# Patient Record
Sex: MALE | Race: WHITE | NOT HISPANIC OR LATINO | Employment: UNEMPLOYED | URBAN - METROPOLITAN AREA
[De-identification: names, ages, dates, MRNs, and addresses within clinical notes are randomized per-mention and may not be internally consistent; named-entity substitution may affect disease eponyms.]

---

## 2017-01-24 DIAGNOSIS — B20 HUMAN IMMUNODEFICIENCY VIRUS (HIV) DISEASE (H): Primary | ICD-10-CM

## 2017-03-09 DIAGNOSIS — B20 HUMAN IMMUNODEFICIENCY VIRUS (HIV) DISEASE (H): ICD-10-CM

## 2017-03-09 DIAGNOSIS — R19.7 DIARRHEA: ICD-10-CM

## 2017-03-09 RX ORDER — EMTRICITABINE AND TENOFOVIR DISOPROXIL FUMARATE 200; 300 MG/1; MG/1
1 TABLET, FILM COATED ORAL DAILY
Qty: 30 TABLET | Refills: 0 | Status: SHIPPED | OUTPATIENT
Start: 2017-03-09 | End: 2017-04-13

## 2017-03-09 RX ORDER — LOPERAMIDE HYDROCHLORIDE 2 MG/1
TABLET ORAL
Qty: 60 TABLET | Refills: 0 | Status: SHIPPED | OUTPATIENT
Start: 2017-03-09 | End: 2017-08-11

## 2017-04-13 DIAGNOSIS — B20 HUMAN IMMUNODEFICIENCY VIRUS (HIV) DISEASE (H): ICD-10-CM

## 2017-04-13 RX ORDER — EMTRICITABINE AND TENOFOVIR DISOPROXIL FUMARATE 200; 300 MG/1; MG/1
1 TABLET, FILM COATED ORAL DAILY
Qty: 30 TABLET | Refills: 0 | Status: SHIPPED | OUTPATIENT
Start: 2017-04-13 | End: 2017-06-01

## 2017-06-01 DIAGNOSIS — B20 HUMAN IMMUNODEFICIENCY VIRUS (HIV) DISEASE (H): ICD-10-CM

## 2017-06-01 RX ORDER — EMTRICITABINE AND TENOFOVIR DISOPROXIL FUMARATE 200; 300 MG/1; MG/1
1 TABLET, FILM COATED ORAL DAILY
Qty: 30 TABLET | Refills: 1 | Status: SHIPPED | OUTPATIENT
Start: 2017-06-01 | End: 2017-07-14

## 2017-07-14 DIAGNOSIS — B20 HUMAN IMMUNODEFICIENCY VIRUS (HIV) DISEASE (H): ICD-10-CM

## 2017-07-14 LAB
ALBUMIN SERPL-MCNC: 3.9 G/DL (ref 3.4–5)
ALP SERPL-CCNC: 86 U/L (ref 40–150)
ALT SERPL W P-5'-P-CCNC: 17 U/L (ref 0–70)
ANION GAP SERPL CALCULATED.3IONS-SCNC: 7 MMOL/L (ref 3–14)
AST SERPL W P-5'-P-CCNC: 8 U/L (ref 0–45)
BASOPHILS # BLD AUTO: 0 10E9/L (ref 0–0.2)
BASOPHILS NFR BLD AUTO: 0.2 %
BILIRUB SERPL-MCNC: 0.3 MG/DL (ref 0.2–1.3)
BUN SERPL-MCNC: 15 MG/DL (ref 7–30)
CALCIUM SERPL-MCNC: 8.6 MG/DL (ref 8.5–10.1)
CD3 CELLS # BLD: 1429 CELLS/UL (ref 603–2990)
CD3 CELLS NFR BLD: 84 % (ref 49–84)
CD3+CD4+ CELLS # BLD: 718 CELLS/UL (ref 441–2156)
CD3+CD4+ CELLS NFR BLD: 42 % (ref 28–63)
CD3+CD4+ CELLS/CD3+CD8+ CLL BLD: 1 % (ref 1.4–2.6)
CD3+CD8+ CELLS # BLD: 724 CELLS/UL (ref 125–1312)
CD3+CD8+ CELLS NFR BLD: 42 % (ref 10–40)
CHLORIDE SERPL-SCNC: 106 MMOL/L (ref 94–109)
CO2 SERPL-SCNC: 25 MMOL/L (ref 20–32)
CREAT SERPL-MCNC: 1.05 MG/DL (ref 0.66–1.25)
DIFFERENTIAL METHOD BLD: ABNORMAL
EOSINOPHIL # BLD AUTO: 0.1 10E9/L (ref 0–0.7)
EOSINOPHIL NFR BLD AUTO: 0.8 %
ERYTHROCYTE [DISTWIDTH] IN BLOOD BY AUTOMATED COUNT: 13.2 % (ref 10–15)
GFR SERPL CREATININE-BSD FRML MDRD: 79 ML/MIN/1.7M2
GLUCOSE SERPL-MCNC: 82 MG/DL (ref 70–99)
HCT VFR BLD AUTO: 41.5 % (ref 40–53)
HGB BLD-MCNC: 14.3 G/DL (ref 13.3–17.7)
IFC SPECIMEN: ABNORMAL
IMM GRANULOCYTES # BLD: 0 10E9/L (ref 0–0.4)
IMM GRANULOCYTES NFR BLD: 0.3 %
IMMUNODEFICIENCY MARKERS SPEC-IMP: ABNORMAL
LYMPHOCYTES # BLD AUTO: 1.6 10E9/L (ref 0.8–5.3)
LYMPHOCYTES NFR BLD AUTO: 27.1 %
MCH RBC QN AUTO: 33.8 PG (ref 26.5–33)
MCHC RBC AUTO-ENTMCNC: 34.5 G/DL (ref 31.5–36.5)
MCV RBC AUTO: 98 FL (ref 78–100)
MONOCYTES # BLD AUTO: 0.4 10E9/L (ref 0–1.3)
MONOCYTES NFR BLD AUTO: 7.3 %
NEUTROPHILS # BLD AUTO: 3.9 10E9/L (ref 1.6–8.3)
NEUTROPHILS NFR BLD AUTO: 64.3 %
NRBC # BLD AUTO: 0 10*3/UL
NRBC BLD AUTO-RTO: 0 /100
PLATELET # BLD AUTO: 198 10E9/L (ref 150–450)
POTASSIUM SERPL-SCNC: 4.4 MMOL/L (ref 3.4–5.3)
PROT SERPL-MCNC: 7.3 G/DL (ref 6.8–8.8)
RBC # BLD AUTO: 4.23 10E12/L (ref 4.4–5.9)
SODIUM SERPL-SCNC: 138 MMOL/L (ref 133–144)
WBC # BLD AUTO: 6.1 10E9/L (ref 4–11)

## 2017-07-14 PROCEDURE — 86359 T CELLS TOTAL COUNT: CPT | Performed by: INTERNAL MEDICINE

## 2017-07-14 PROCEDURE — 86360 T CELL ABSOLUTE COUNT/RATIO: CPT | Performed by: INTERNAL MEDICINE

## 2017-07-14 PROCEDURE — 87536 HIV-1 QUANT&REVRSE TRNSCRPJ: CPT | Performed by: INTERNAL MEDICINE

## 2017-07-14 RX ORDER — EMTRICITABINE AND TENOFOVIR DISOPROXIL FUMARATE 200; 300 MG/1; MG/1
1 TABLET, FILM COATED ORAL DAILY
Qty: 30 TABLET | Refills: 0 | Status: SHIPPED | OUTPATIENT
Start: 2017-07-14 | End: 2017-08-20

## 2017-07-18 LAB
HIV1 RNA # PLAS NAA DL=20: ABNORMAL {COPIES}/ML
HIV1 RNA SERPL NAA+PROBE-LOG#: <1.3 {LOG_COPIES}/ML

## 2017-07-26 ENCOUNTER — OFFICE VISIT (OUTPATIENT)
Dept: INFECTIOUS DISEASES | Facility: CLINIC | Age: 37
End: 2017-07-26
Attending: INTERNAL MEDICINE
Payer: MEDICARE

## 2017-07-26 VITALS
TEMPERATURE: 98.6 F | RESPIRATION RATE: 18 BRPM | WEIGHT: 171.08 LBS | OXYGEN SATURATION: 95 % | HEIGHT: 72 IN | SYSTOLIC BLOOD PRESSURE: 124 MMHG | BODY MASS INDEX: 23.17 KG/M2 | HEART RATE: 98 BPM | DIASTOLIC BLOOD PRESSURE: 78 MMHG

## 2017-07-26 DIAGNOSIS — Z63.79 STRESSFUL LIFE EVENTS AFFECTING FAMILY AND HOUSEHOLD: ICD-10-CM

## 2017-07-26 DIAGNOSIS — F31.9 BIPOLAR AFFECTIVE DISORDER, REMISSION STATUS UNSPECIFIED (H): ICD-10-CM

## 2017-07-26 DIAGNOSIS — B20 HUMAN IMMUNODEFICIENCY VIRUS (HIV) DISEASE (H): Primary | ICD-10-CM

## 2017-07-26 DIAGNOSIS — F15.10 METHAMPHETAMINE USE (H): ICD-10-CM

## 2017-07-26 DIAGNOSIS — J06.9 UPPER RESPIRATORY INFECTION, VIRAL: ICD-10-CM

## 2017-07-26 PROCEDURE — 99213 OFFICE O/P EST LOW 20 MIN: CPT | Mod: ZF

## 2017-07-26 RX ORDER — DIVALPROEX SODIUM 500 MG/1
500 TABLET, DELAYED RELEASE ORAL 2 TIMES DAILY
COMMUNITY
End: 2017-07-26 | Stop reason: DRUGHIGH

## 2017-07-26 RX ORDER — LORAZEPAM 0.5 MG/1
0.5 TABLET ORAL EVERY 6 HOURS PRN
COMMUNITY
End: 2017-09-27

## 2017-07-26 RX ORDER — DIVALPROEX SODIUM 250 MG/1
250 TABLET, DELAYED RELEASE ORAL 2 TIMES DAILY
COMMUNITY
End: 2018-02-05

## 2017-07-26 ASSESSMENT — PAIN SCALES - GENERAL: PAINLEVEL: NO PAIN (0)

## 2017-07-26 NOTE — NURSING NOTE
"Chief Complaint   Patient presents with     RECHECK     B20       Initial /78 (BP Location: Right arm, Patient Position: Sitting, Cuff Size: Adult Regular)  Pulse 98  Temp 98.6  F (37  C) (Oral)  Resp 18  Ht 1.829 m (6' 0.01\")  Wt 77.6 kg (171 lb 1.2 oz)  SpO2 95%  BMI 23.2 kg/m2 Estimated body mass index is 23.2 kg/(m^2) as calculated from the following:    Height as of this encounter: 1.829 m (6' 0.01\").    Weight as of this encounter: 77.6 kg (171 lb 1.2 oz).  Medication Reconciliation: complete     Chelsey Corona CMA  7/26/2017 2:36 PM        "

## 2017-07-26 NOTE — MR AVS SNAPSHOT
After Visit Summary   7/26/2017    Reynaldo Branch    MRN: [id number]           Patient Information     Date Of Birth          1980        Visit Information        Provider Department      7/26/2017 2:30 PM Yury Ramirez MD [facility] and Infectious Diseases        Today's Diagnoses     Human immunodeficiency virus (HIV) disease (H)    -  1    Methamphetamine use        Upper respiratory infection, viral        Bipolar affective disorder, remission status unspecified (H)        Stressful life events affecting family and household           Follow-ups after your visit        Follow-up notes from your care team     Return in about 3 months (around 10/26/2017).      Your next 10 appointments already scheduled     Oct 25, 2017  4:30 PM CDT   (Arrive by 4:15 PM)   Return Visit with MD JAGDEEP Doe [facility] and Infectious Diseases ([facility])    [address] [place] [id number] [phone]              Who to contact     If you have questions or need follow up information about today's clinic visit or your schedule please contact [facility] AND INFECTIOUS DISEASES directly at [phone].  Normal or non-critical lab and imaging results will be communicated to you by MyChart, letter or phone within 4 business days after the clinic has received the results. If you do not hear from us within 7 days, please contact the clinic through MyChart or phone. If you have a critical or abnormal lab result, we will notify you by phone as soon as possible.  Submit refill requests through [employer] or call your pharmacy and they will forward the refill request to us. Please allow 3 business days for your refill to be completed.          Additional Information About Your Visit        Care EveryWhere ID     This is your Care EveryWhere ID. This could be used by other organizations to access your [facility]  "records  [id number]        Your Vitals Were     Pulse Temperature Respirations Height Pulse Oximetry BMI (Body Mass Index)    98 98.6  F (37  C) (Oral) 18 1.829 m (6' 0.01\") 95% 23.2 kg/m2       Blood Pressure from Last 3 Encounters:   07/26/17 124/78   06/10/15 121/67   11/05/14 141/85    Weight from Last 3 Encounters:   07/26/17 77.6 kg (171 lb 1.2 oz)   06/10/15 85.2 kg (187 lb 14.4 oz)   11/05/14 81.8 kg (180 lb 6.4 oz)                 Today's Medication Changes          These changes are accurate as of: 7/26/17 11:59 PM.  If you have any questions, ask your nurse or doctor.               These medicines have changed or have updated prescriptions.        Dose/Directions    divalproex 250 MG EC tablet   Commonly known as:  DEPAKOTE   This may have changed:  Another medication with the same name was removed. Continue taking this medication, and follow the directions you see here.   Changed by:  Yury Ramirez MD        Dose:  250 mg   Take 250 mg by mouth 2 times daily   Refills:  0            Where to get your medicines      These medications were sent to [facility] [address] [place] [id number]    Hours:  24-hours Phone:  [phone]     darunavir 800 MG tablet    emtricitabine-tenofovir 200-300 MG per tablet    ritonavir 100 MG capsule                Primary Care Provider Office Phone # Fax #    Yury Ramirez [phone]       [address] [place] [address]  [facility] [address]        Equal Access to Services     JANE MILLAN AH: Hadii julio c Palencia, warochelleda luqadaha, qaybta kaalmada vignesh gonzalez. So [facility] [phone].    ATENCIÓN: Si habla español, tiene a ariza disposición servicios gratuitos de asistencia lingüística. Llame al [phone].    We comply with applicable federal civil rights laws and [place] laws. We do not discriminate on the " basis of race, color, national origin, age, disability sex, sexual orientation or gender identity.            Thank you!     Thank you for choosing [facility] AND INFECTIOUS DISEASES  for your care. Our goal is always to provide you with excellent care. Hearing back from our patients is one way we can continue to improve our services. Please take a few minutes to complete the written survey that you may receive in the mail after your visit with us. Thank you!             Your Updated Medication List - Protect others around you: Learn how to safely use, store and throw away your medicines at www.disposemymeds.org.          This list is accurate as of: 7/26/17 11:59 PM.  Always use your most recent med list.                   Brand Name Dispense Instructions for use Diagnosis    darunavir 800 MG tablet    PREZISTA    30 tablet    Take 1 tablet (800 mg) by mouth daily    Human immunodeficiency virus (HIV) disease (H)       divalproex 250 MG EC tablet    DEPAKOTE     Take 250 mg by mouth 2 times daily        emtricitabine-tenofovir 200-300 MG per tablet    TRUVADA    30 tablet    Take 1 tablet by mouth daily    Human immunodeficiency virus (HIV) disease (H)       LORazepam 0.5 MG tablet    ATIVAN     Take 0.5 mg by mouth every 6 hours as needed for anxiety        ritonavir 100 MG capsule    NORVIR    30 capsule    Take 1 capsule (100 mg) by mouth daily    Human immunodeficiency virus (HIV) disease (H)

## 2017-08-11 DIAGNOSIS — R19.7 DIARRHEA: ICD-10-CM

## 2017-08-11 RX ORDER — LOPERAMIDE HYDROCHLORIDE 2 MG/1
TABLET ORAL
Qty: 60 TABLET | Refills: 5 | Status: SHIPPED | OUTPATIENT
Start: 2017-08-11 | End: 2018-02-05

## 2017-08-20 RX ORDER — EMTRICITABINE AND TENOFOVIR DISOPROXIL FUMARATE 200; 300 MG/1; MG/1
1 TABLET, FILM COATED ORAL DAILY
Qty: 30 TABLET | Refills: 5 | Status: SHIPPED | OUTPATIENT
Start: 2017-08-20 | End: 2017-09-27 | Stop reason: ALTCHOICE
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
## 2017-09-18 ENCOUNTER — OFFICE VISIT (OUTPATIENT)
Dept: INFECTIOUS DISEASES | Facility: CLINIC | Age: 37
End: 2017-09-18
Payer: MEDICARE

## 2017-09-18 DIAGNOSIS — Z71.89 COUNSELING AND COORDINATION OF CARE: Primary | ICD-10-CM

## 2017-09-18 DIAGNOSIS — B20 HUMAN IMMUNODEFICIENCY VIRUS (HIV) DISEASE (H): ICD-10-CM

## 2017-09-18 LAB
ALBUMIN SERPL-MCNC: 3.9 G/DL (ref 3.4–5)
ALP SERPL-CCNC: 87 U/L (ref 40–150)
ALT SERPL W P-5'-P-CCNC: 14 U/L (ref 0–70)
ANION GAP SERPL CALCULATED.3IONS-SCNC: 4 MMOL/L (ref 3–14)
AST SERPL W P-5'-P-CCNC: 8 U/L (ref 0–45)
BASOPHILS # BLD AUTO: 0 10E9/L (ref 0–0.2)
BASOPHILS NFR BLD AUTO: 0.3 %
BILIRUB SERPL-MCNC: 0.3 MG/DL (ref 0.2–1.3)
BUN SERPL-MCNC: 15 MG/DL (ref 7–30)
CALCIUM SERPL-MCNC: 8.3 MG/DL (ref 8.5–10.1)
CHLORIDE SERPL-SCNC: 106 MMOL/L (ref 94–109)
CO2 SERPL-SCNC: 30 MMOL/L (ref 20–32)
CREAT SERPL-MCNC: 1.26 MG/DL (ref 0.66–1.25)
DIFFERENTIAL METHOD BLD: ABNORMAL
EOSINOPHIL # BLD AUTO: 0 10E9/L (ref 0–0.7)
EOSINOPHIL NFR BLD AUTO: 0.6 %
ERYTHROCYTE [DISTWIDTH] IN BLOOD BY AUTOMATED COUNT: 12.8 % (ref 10–15)
GFR SERPL CREATININE-BSD FRML MDRD: 64 ML/MIN/1.7M2
GLUCOSE SERPL-MCNC: 83 MG/DL (ref 70–99)
HCT VFR BLD AUTO: 43.1 % (ref 40–53)
HGB BLD-MCNC: 14.7 G/DL (ref 13.3–17.7)
IMM GRANULOCYTES # BLD: 0 10E9/L (ref 0–0.4)
IMM GRANULOCYTES NFR BLD: 0.3 %
LIPASE SERPL-CCNC: 140 U/L (ref 73–393)
LYMPHOCYTES # BLD AUTO: 1.7 10E9/L (ref 0.8–5.3)
LYMPHOCYTES NFR BLD AUTO: 26 %
MCH RBC QN AUTO: 34 PG (ref 26.5–33)
MCHC RBC AUTO-ENTMCNC: 34.1 G/DL (ref 31.5–36.5)
MCV RBC AUTO: 100 FL (ref 78–100)
MONOCYTES # BLD AUTO: 0.4 10E9/L (ref 0–1.3)
MONOCYTES NFR BLD AUTO: 6.1 %
NEUTROPHILS # BLD AUTO: 4.2 10E9/L (ref 1.6–8.3)
NEUTROPHILS NFR BLD AUTO: 66.7 %
NRBC # BLD AUTO: 0 10*3/UL
NRBC BLD AUTO-RTO: 0 /100
PLATELET # BLD AUTO: 171 10E9/L (ref 150–450)
POTASSIUM SERPL-SCNC: 3.9 MMOL/L (ref 3.4–5.3)
PROT SERPL-MCNC: 7.2 G/DL (ref 6.8–8.8)
RBC # BLD AUTO: 4.32 10E12/L (ref 4.4–5.9)
SODIUM SERPL-SCNC: 140 MMOL/L (ref 133–144)
WBC # BLD AUTO: 6.4 10E9/L (ref 4–11)

## 2017-09-18 PROCEDURE — 86360 T CELL ABSOLUTE COUNT/RATIO: CPT | Performed by: INTERNAL MEDICINE

## 2017-09-18 PROCEDURE — 86359 T CELLS TOTAL COUNT: CPT | Performed by: INTERNAL MEDICINE

## 2017-09-18 PROCEDURE — 80053 COMPREHEN METABOLIC PANEL: CPT | Performed by: INTERNAL MEDICINE

## 2017-09-18 PROCEDURE — 85025 COMPLETE CBC W/AUTO DIFF WBC: CPT | Performed by: INTERNAL MEDICINE

## 2017-09-18 PROCEDURE — 83690 ASSAY OF LIPASE: CPT | Performed by: INTERNAL MEDICINE

## 2017-09-18 PROCEDURE — 36415 COLL VENOUS BLD VENIPUNCTURE: CPT | Performed by: INTERNAL MEDICINE

## 2017-09-18 PROCEDURE — 87536 HIV-1 QUANT&REVRSE TRNSCRPJ: CPT | Performed by: INTERNAL MEDICINE

## 2017-09-18 NOTE — MR AVS SNAPSHOT
After Visit Summary   9/18/2017    Reynaldo Branch    MRN: [id number]           Patient Information     Date Of Birth          1980        Visit Information        Provider Department      9/18/2017 3:00 PM Terrance Issa MSW [facility]        Today's Diagnoses     Counseling and coordination of care    -  1       Follow-ups after your visit        Your next 10 appointments already scheduled     Sep 27, 2017  4:30 PM CDT   (Arrive by 4:15 PM)   Return Visit with Yury Ramirez MD   [facility] ([facility])    [address] [place] [address]  3rd [facility] [address] [phone]              Who to contact     If you have questions or need follow up information about today's clinic visit or your schedule please contact [facility] AND INFECTIOUS DISEASES directly at [phone].  Normal or non-critical lab and imaging results will be communicated to you by MyChart, letter or phone within 4 business days after the clinic has received the results. If you do not hear from us within 7 days, please contact the clinic through MyChart or phone. If you have a critical or abnormal lab result, we will notify you by phone as soon as possible.  Submit refill requests through [employer] or call your pharmacy and they will forward the refill request to us. Please allow 3 business days for your refill to be completed.          Additional Information About Your Visit        Care EveryWhere ID     This is your Care EveryWhere ID. This could be used by other organizations to access your [place] medical records  [id number]         Blood Pressure from Last 3 Encounters:   07/26/17 124/78   06/10/15 121/67   11/05/14 141/85    Weight from Last 3 Encounters:   07/26/17 77.6 kg (171 lb 1.2 oz)   06/10/15 85.2 kg (187 lb 14.4 oz)   11/05/14 81.8 kg (180 lb 6.4 oz)              Today, you had the  following     No orders found for display       Primary Care Provider Office Phone # Fax #    Yury Ramirez [phone]       [address] [place] [address]        Equal Access to Services     JANE MILLAN : Hadii aad ku hadrichardo Socareyali, waaxda luqadaha, qaybta kaalmada adeegyada, vignesh tongcarmen torresluis magana raji juarez. So [facility] [phone].    ATENCIÓN: Si habla español, tiene a ariza disposición servicios gratuitos de asistencia lingüística. Llame al [phone].    We comply with applicable federal civil rights laws and [place] laws. We do not discriminate on the basis of race, color, national origin, age, disability sex, sexual orientation or gender identity.            Thank you!     Thank you for choosing [facility] AND INFECTIOUS DISEASES  for your care. Our goal is always to provide you with excellent care. Hearing back from our patients is one way we can continue to improve our services. Please take a few minutes to complete the written survey that you may receive in the mail after your visit with us. Thank you!             Your Updated Medication List - Protect others around you: Learn how to safely use, store and throw away your medicines at www.disposemymeds.org.          This list is accurate as of: 9/18/17 11:59 PM.  Always use your most recent med list.                   Brand Name Dispense Instructions for use Diagnosis    darunavir 800 MG tablet    PREZISTA    30 tablet    Take 1 tablet (800 mg) by mouth daily    Human immunodeficiency virus (HIV) disease (H)       divalproex 250 MG EC tablet    DEPAKOTE     Take 250 mg by mouth 2 times daily        emtricitabine-tenofovir 200-300 MG per tablet    TRUVADA    30 tablet    Take 1 tablet by mouth daily    Human immunodeficiency virus (HIV) disease (H)       loperamide 2 MG tablet    IMODIUM A-D    60 tablet    May use one or two tablets daily or twice daily as needed for diarrhea.    Diarrhea        LORazepam 0.5 MG tablet    ATIVAN     Take 0.5 mg by mouth every 6 hours as needed for anxiety        ritonavir 100 MG capsule    NORVIR    30 capsule    Take 1 capsule (100 mg) by mouth daily    Human immunodeficiency virus (HIV) disease (H)

## 2017-09-19 LAB
CD3 CELLS # BLD: 1391 CELLS/UL (ref 603–2990)
CD3 CELLS NFR BLD: 85 % (ref 49–84)
CD3+CD4+ CELLS # BLD: 715 CELLS/UL (ref 441–2156)
CD3+CD4+ CELLS NFR BLD: 44 % (ref 28–63)
CD3+CD4+ CELLS/CD3+CD8+ CLL BLD: 1.02 % (ref 1.4–2.6)
CD3+CD8+ CELLS # BLD: 692 CELLS/UL (ref 125–1312)
CD3+CD8+ CELLS NFR BLD: 43 % (ref 10–40)
HIV1 RNA # PLAS NAA DL=20: <20 {COPIES}/ML
HIV1 RNA SERPL NAA+PROBE-LOG#: <1.3 {LOG_COPIES}/ML
IFC SPECIMEN: ABNORMAL

## 2017-09-27 ENCOUNTER — OFFICE VISIT (OUTPATIENT)
Dept: INFECTIOUS DISEASES | Facility: CLINIC | Age: 37
End: 2017-09-27
Attending: INTERNAL MEDICINE
Payer: MEDICARE

## 2017-09-27 VITALS
BODY MASS INDEX: 24.57 KG/M2 | TEMPERATURE: 97.6 F | HEIGHT: 72 IN | DIASTOLIC BLOOD PRESSURE: 71 MMHG | SYSTOLIC BLOOD PRESSURE: 125 MMHG | WEIGHT: 181.4 LBS | HEART RATE: 79 BPM

## 2017-09-27 DIAGNOSIS — Z21 HUMAN IMMUNODEFICIENCY VIRUS I INFECTION (H): Primary | ICD-10-CM

## 2017-09-27 DIAGNOSIS — B20 HUMAN IMMUNODEFICIENCY VIRUS (HIV) DISEASE (H): ICD-10-CM

## 2017-09-27 DIAGNOSIS — Z72.0 TOBACCO ABUSE: ICD-10-CM

## 2017-09-27 DIAGNOSIS — R79.89 ELEVATED SERUM CREATININE: ICD-10-CM

## 2017-09-27 PROCEDURE — 99212 OFFICE O/P EST SF 10 MIN: CPT | Mod: ZF

## 2017-09-27 RX ORDER — NICOTINE 21 MG/24HR
1 PATCH, TRANSDERMAL 24 HOURS TRANSDERMAL EVERY 24 HOURS
Qty: 30 PATCH | Refills: 3 | Status: SHIPPED | OUTPATIENT
Start: 2017-09-27 | End: 2018-02-05

## 2017-09-27 ASSESSMENT — PAIN SCALES - GENERAL: PAINLEVEL: NO PAIN (0)

## 2017-09-27 NOTE — MR AVS SNAPSHOT
After Visit Summary   9/27/2017    Reynaldo Branch    MRN: [id number]           Patient Information     Date Of Birth          1980        Visit Information        Provider Department      9/27/2017 4:30 PM Yury Ramirez MD [facility] Infectious Diseases        Today's Diagnoses     Human immunodeficiency virus I infection (H)    -  1    Tobacco abuse        Elevated serum creatinine        Human immunodeficiency virus (HIV) disease (H)           Follow-ups after your visit        Follow-up notes from your care team     Return in about 6 months (around 3/27/2018).      Who to contact     If you have questions or need follow up information about today's clinic visit or your schedule please contact [facility] AND INFECTIOUS DISEASES directly at [phone].  Normal or non-critical lab and imaging results will be communicated to you by MyChart, letter or phone within 4 business days after the clinic has received the results. If you do not hear from us within 7 days, please contact the clinic through MyChart or phone. If you have a critical or abnormal lab result, we will notify you by phone as soon as possible.  Submit refill requests through [employer] or call your pharmacy and they will forward the refill request to us. Please allow 3 business days for your refill to be completed.          Additional Information About Your Visit        Care EveryWhere ID     This is your Care EveryWhere ID. This could be used by other organizations to access your [place] medical records  [id number]        Your Vitals Were     Pulse Temperature Height BMI (Body Mass Index)          79 97.6  F (36.4  C) (Oral) 1.829 m (6') 24.6 kg/m2         Blood Pressure from Last 3 Encounters:   09/27/17 125/71   07/26/17 124/78   06/10/15 121/67    Weight from Last 3 Encounters:   09/27/17 82.3 kg (181 lb 6.4 oz)   07/26/17 77.6 kg (171 lb 1.2 oz)   06/10/15 85.2 kg (187 lb 14.4 oz)                  Today's Medication Changes          These changes are accurate as of: 9/27/17 11:59 PM.  If you have any questions, ask your nurse or doctor.               Start taking these medicines.        Dose/Directions    emtricitabine-tenofovir -25 MG per tablet   Commonly known as:  DESCOVY   Used for:  Human immunodeficiency virus (HIV) disease   Started by:  Yury Ramirez MD        Dose:  1 tablet   Take 1 tablet by mouth daily   Quantity:  30 tablet   Refills:  11       nicotine 21 MG/24HR 24 hr patch   Commonly known as:  EQ NICOTINE   Used for:  Tobacco abuse   Started by:  Yury Ramirez MD        Dose:  1 patch   Place 1 patch onto the skin every 24 hours   Quantity:  30 patch   Refills:  3       nicotine polacrilex 4 MG gum   Commonly known as:  EQ NICOTINE POLACRILEX   Used for:  Tobacco abuse   Started by:  Yury Ramirez MD        Dose:  4 mg   Place 1 each (4 mg) inside cheek as needed for smoking cessation   Quantity:  270 tablet   Refills:  3       varenicline 0.5 MG X 11 & 1 MG X 42 tablet   Commonly known as:  CHANTIX STARTING MONTH NACHO   Used for:  Tobacco abuse   Started by:  Yury Ramirez MD        Take as directed.   Quantity:  53 tablet   Refills:  3         Stop taking these medicines if you haven't already. Please contact your care team if you have questions.     emtricitabine-tenofovir 200-300 MG per tablet   Commonly known as:  TRUVADA   Stopped by:  Yury Ramirez MD                Where to get your medicines      These medications were sent to [facility] [address] [place] [address]  [id number] [place] [id number]    Hours:  24-hours Phone:  [phone]     emtricitabine-tenofovir -25 MG per tablet    nicotine 21 MG/24HR 24 hr patch    nicotine polacrilex 4 MG gum    varenicline 0.5 MG X 11 & 1 MG X 42 tablet                Primary Care Provider Office Phone # Fax #    Yury Ramirez  [phone]       [address] [place] [address]        Equal Access to Services     JANE MILLAN : Hadii aad ku hadrichardizzy [place], waashley rossheberha, nikkycielo kaedwardda yulianthonyjake, waxshay silviain hayaacarmen torresluis janellpraneethroland juarez. So [facility] [phone].    ATENCIÓN: Si habla español, tiene a ariza disposición servicios gratuitos de asistencia lingüística. Llame al [phone].    We comply with applicable federal civil rights laws and [place] laws. We do not discriminate on the basis of race, color, national origin, age, disability, sex, sexual orientation, or gender identity.            Thank you!     Thank you for choosing [facility] AND INFECTIOUS DISEASES  for your care. Our goal is always to provide you with excellent care. Hearing back from our patients is one way we can continue to improve our services. Please take a few minutes to complete the written survey that you may receive in the mail after your visit with us. Thank you!             Your Updated Medication List - Protect others around you: Learn how to safely use, store and throw away your medicines at www.disposemymeds.org.          This list is accurate as of: 9/27/17 11:59 PM.  Always use your most recent med list.                   Brand Name Dispense Instructions for use Diagnosis    darunavir 800 MG tablet    PREZISTA    30 tablet    Take 1 tablet (800 mg) by mouth daily    Human immunodeficiency virus (HIV) disease       divalproex 250 MG EC tablet    DEPAKOTE     Take 250 mg by mouth 2 times daily        emtricitabine-tenofovir -25 MG per tablet    DESCOVY    30 tablet    Take 1 tablet by mouth daily    Human immunodeficiency virus (HIV) disease       loperamide 2 MG tablet    IMODIUM A-D    60 tablet    May use one or two tablets daily or twice daily as needed for diarrhea.    Diarrhea       nicotine 21 MG/24HR 24 hr patch    EQ NICOTINE    30 patch    Place 1 patch onto the skin every 24 hours    Tobacco  abuse       nicotine polacrilex 4 MG gum    EQ NICOTINE POLACRILEX    270 tablet    Place 1 each (4 mg) inside cheek as needed for smoking cessation    Tobacco abuse       ritonavir 100 MG capsule    NORVIR    30 capsule    Take 1 capsule (100 mg) by mouth daily    Human immunodeficiency virus (HIV) disease       TRAZODONE HCL PO      Take 50 mg by mouth nightly as needed for sleep        varenicline 0.5 MG X 11 & 1 MG X 42 tablet    CHANTIX STARTING MONTH NACHO    53 tablet    Take as directed.    Tobacco abuse

## 2017-09-27 NOTE — NURSING NOTE
Chief Complaint   Patient presents with     RECHECK     follow up B20       Initial /71  Pulse 79  Temp 97.6  F (36.4  C) (Oral)  Ht 1.829 m (6')  Wt 82.3 kg (181 lb 6.4 oz)  BMI 24.6 kg/m2 Estimated body mass index is 24.6 kg/(m^2) as calculated from the following:    Height as of this encounter: 1.829 m (6').    Weight as of this encounter: 82.3 kg (181 lb 6.4 oz).  Medication Reconciliation: complete
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
## 2017-11-14 ENCOUNTER — TELEPHONE (OUTPATIENT)
Dept: INFECTIOUS DISEASES | Facility: CLINIC | Age: 37
End: 2017-11-14

## 2017-11-14 NOTE — TELEPHONE ENCOUNTER
Savi (pharmacist, [employer]) LVM re: therapeutic duplications. Pt filled pt filled both Truvada and Descovy 9/2017, then 10/2017 filled Truvada, then 11/2017 filled Descovy. Asking which med pt should be using and which should be D/C d. Jose A does not have documentation on therapeutic duplication. Can be reached at [phone]. Please contact [facility] to also D/C Rx. Sent to ID pool.

## 2017-11-15 NOTE — TELEPHONE ENCOUNTER
Per Dr. Ramirez's note from pt's last clinic visit, pt's Truvada was changed to Descovy. Called Savi young and LVM to let her know and called [facility] and d/c'd the Truvada prescription.  Alem Torres RN

## 2018-01-23 NOTE — TELEPHONE ENCOUNTER
Called pt who reports he has been out of HIV meds for 2-3 days now. He tried to pick them up at pharmacy but they wanted $1400.00. Pt asking for help from Terrance. Nurse assured pt we would have [facility]ance call him tomorrow to see what can be done. Pt amenable to this plan. Pt has difficulty getting off of work so he hopes this can be resolved without him having to come in to clinic.  Karla Sood RN

## 2018-02-05 ENCOUNTER — HOSPITAL ENCOUNTER (EMERGENCY)
Facility: CLINIC | Age: 38
Discharge: HOME | End: 2018-02-05
Attending: EMERGENCY MEDICINE | Admitting: EMERGENCY MEDICINE
Payer: MEDICARE

## 2018-02-05 ENCOUNTER — APPOINTMENT (OUTPATIENT)
Dept: GENERAL RADIOLOGY | Facility: CLINIC | Age: 38
End: 2018-02-05
Attending: EMERGENCY MEDICINE
Payer: MEDICARE

## 2018-02-05 VITALS
DIASTOLIC BLOOD PRESSURE: 75 MMHG | HEIGHT: 71 IN | HEART RATE: 75 BPM | SYSTOLIC BLOOD PRESSURE: 118 MMHG | RESPIRATION RATE: 18 BRPM | BODY MASS INDEX: 26.6 KG/M2 | OXYGEN SATURATION: 97 % | TEMPERATURE: 99.6 F | WEIGHT: 190 LBS

## 2018-02-05 DIAGNOSIS — R07.89 ATYPICAL CHEST PAIN: ICD-10-CM

## 2018-02-05 LAB
ALBUMIN SERPL-MCNC: 4.2 G/DL (ref 3.4–5)
ALP SERPL-CCNC: 77 U/L (ref 40–150)
ALT SERPL W P-5'-P-CCNC: 16 U/L (ref 0–70)
ANION GAP SERPL CALCULATED.3IONS-SCNC: 8 MMOL/L (ref 3–14)
AST SERPL W P-5'-P-CCNC: 13 U/L (ref 0–45)
BASOPHILS # BLD AUTO: 0 10E9/L (ref 0–0.2)
BASOPHILS NFR BLD AUTO: 0.6 %
BILIRUB SERPL-MCNC: 0.7 MG/DL (ref 0.2–1.3)
BUN SERPL-MCNC: 20 MG/DL (ref 7–30)
CALCIUM SERPL-MCNC: 8.6 MG/DL (ref 8.5–10.1)
CHLORIDE SERPL-SCNC: 105 MMOL/L (ref 94–109)
CO2 SERPL-SCNC: 25 MMOL/L (ref 20–32)
CREAT SERPL-MCNC: 1.19 MG/DL (ref 0.66–1.25)
DIFFERENTIAL METHOD BLD: ABNORMAL
EOSINOPHIL # BLD AUTO: 0 10E9/L (ref 0–0.7)
EOSINOPHIL NFR BLD AUTO: 0.4 %
ERYTHROCYTE [DISTWIDTH] IN BLOOD BY AUTOMATED COUNT: 12.2 % (ref 10–15)
GFR SERPL CREATININE-BSD FRML MDRD: 68 ML/MIN/1.7M2
GLUCOSE SERPL-MCNC: 84 MG/DL (ref 70–99)
HCT VFR BLD AUTO: 40.4 % (ref 40–53)
HGB BLD-MCNC: 14.1 G/DL (ref 13.3–17.7)
IMM GRANULOCYTES # BLD: 0 10E9/L (ref 0–0.4)
IMM GRANULOCYTES NFR BLD: 0.3 %
LYMPHOCYTES # BLD AUTO: 2.3 10E9/L (ref 0.8–5.3)
LYMPHOCYTES NFR BLD AUTO: 33 %
MCH RBC QN AUTO: 34.1 PG (ref 26.5–33)
MCHC RBC AUTO-ENTMCNC: 34.9 G/DL (ref 31.5–36.5)
MCV RBC AUTO: 98 FL (ref 78–100)
MONOCYTES # BLD AUTO: 0.5 10E9/L (ref 0–1.3)
MONOCYTES NFR BLD AUTO: 7.4 %
NEUTROPHILS # BLD AUTO: 4.1 10E9/L (ref 1.6–8.3)
NEUTROPHILS NFR BLD AUTO: 58.3 %
NRBC # BLD AUTO: 0 10*3/UL
NRBC BLD AUTO-RTO: 0 /100
PLATELET # BLD AUTO: 209 10E9/L (ref 150–450)
POTASSIUM SERPL-SCNC: 3.8 MMOL/L (ref 3.4–5.3)
PROT SERPL-MCNC: 7.5 G/DL (ref 6.8–8.8)
RBC # BLD AUTO: 4.13 10E12/L (ref 4.4–5.9)
SODIUM SERPL-SCNC: 138 MMOL/L (ref 133–144)
TROPONIN I SERPL-MCNC: <0.015 UG/L (ref 0–0.04)
WBC # BLD AUTO: 7.1 10E9/L (ref 4–11)

## 2018-02-05 PROCEDURE — 85025 COMPLETE CBC W/AUTO DIFF WBC: CPT | Performed by: EMERGENCY MEDICINE

## 2018-02-05 PROCEDURE — 99285 EMERGENCY DEPT VISIT HI MDM: CPT | Mod: 25

## 2018-02-05 PROCEDURE — 84484 ASSAY OF TROPONIN QUANT: CPT | Performed by: EMERGENCY MEDICINE

## 2018-02-05 PROCEDURE — 93005 ELECTROCARDIOGRAM TRACING: CPT

## 2018-02-05 PROCEDURE — 71046 X-RAY EXAM CHEST 2 VIEWS: CPT

## 2018-02-05 PROCEDURE — 36415 COLL VENOUS BLD VENIPUNCTURE: CPT | Performed by: EMERGENCY MEDICINE

## 2018-02-05 PROCEDURE — 80053 COMPREHEN METABOLIC PANEL: CPT | Performed by: EMERGENCY MEDICINE

## 2018-02-05 RX ORDER — LOPERAMIDE HCL 2 MG
2 CAPSULE ORAL 4 TIMES DAILY PRN
COMMUNITY
End: 2018-11-28

## 2018-02-05 ASSESSMENT — ENCOUNTER SYMPTOMS
FEVER: 0
COUGH: 0
SHORTNESS OF BREATH: 1

## 2018-02-05 NOTE — ED AVS SNAPSHOT
[facility] Emergency Department    [address]    [facility] [address]    Phone:  [phone]    Fax:  [phone]                                       Reynaldo Branch   MRN: [id number]    Department:  [facility] Emergency Department   Date of Visit:  2/5/2018           Patient Information     Date Of Birth          1980        Your diagnoses for this visit were:     Atypical chest pain        You were seen by Keysha Lawrence MD.      Follow-up Information     Follow up with Yury Ramirez MD. Go in 1 week.    Specialty:  Internal Medicine    Contact information:    [address]  [facility] [address]  [phone]          Discharge Instructions         GERD (Adult)    The esophagus is a tube that carries food from the mouth to the stomach. A valve at the lower end of the esophagus prevents stomach acid from flowing upward. When this valve doesn't work properly, stomach contents may repeatedly flow back up (reflux) into the esophagus. This is called gastroesophageal reflux disease (GERD). GERD can irritate the esophagus. It can cause problems with swallowing or breathing. In severe cases, GERD can cause recurrent pneumonia or other serious problems.  Symptoms of reflux include burning, pressure or sharp pain in the upper abdomen or mid to lower chest. The pain can spread to the neck, back, or shoulder. There may be belching, an acid taste in the back of the throat, chronic cough, or sore throat or hoarseness. GERD symptoms often occur during the day after a big meal. They can also occur at night when lying down.   Home care  Lifestyle changes can help reduce symptoms. If needed, medicines may be prescribed. Symptoms often improve with treatment, but if treatment is stopped, the symptoms often return after a few months. So most persons with GERD will need to continue treatment.  Lifestyle changes    Limit or avoid fatty, fried, and spicy foods, as well  "as coffee, chocolate, mint, and foods with high acid content such as tomatoes and citrus fruit and juices (orange, grapefruit, lemon).    Don t eat large meals, especially at night. Frequent, smaller meals are best. Do not lie down right after eating. And don t eat anything 3 hours before going to bed.    Avoid drinking alcohol and smoking. As much as possible, stay away from second hand smoke.    If you are overweight, losing weight will reduce symptoms.     Avoid wearing tight clothing around your stomach area.    If your symptoms occur during sleep, use a foam wedge to elevate your upper body (not just your head.) Or, place 4\" blocks under the head of your bed.  Medicines  If needed, medicines can help relieve the symptoms of GERD and prevent damage to the esophagus. Discuss a medicine plan with your healthcare provider. This may include one or more of the following medicines:    Antacids to help neutralize the normal acids in your stomach.    Acid blockers (H2 blockers) to decrease acid production.    Acid inhibitors (PPIs) to decrease acid production in a different way than the blockers. They may work better, but can take a little longer to take effect.  Take an antacid 30-60 minutes after eating and at bedtime, but not at the same time as an acid blocker.  Try not to take medicines such as ibuprofen and aspirin. If you are taking aspirin for your heart or other medical reasons, talk to your healthcare provider about stopping it.  Follow-up care  Follow up with your healthcare provider or as advised by our staff.  When to seek medical advice  Call your healthcare provider if any of the following occur:    Stomach pain gets worse or moves to the lower right abdomen (appendix area)    Chest pain appears or gets worse, or spreads to the back, neck, shoulder, or arm    Frequent vomiting (can t keep down liquids)    Blood in the stool or vomit (red or black in color)    Feeling weak or dizzy    Fever of 100.4 F " (38 C) or higher, or as directed by your healthcare provider  Date Last Reviewed: 6/23/2015 2000-2017 [employer]. [address] [place] [address]. All rights reserved. This information is not intended as a substitute for professional medical care. Always follow your healthcare professional's instructions.         *CHEST PAIN, UNCERTAIN CAUSE    Based on your exam today, the exact cause of your chest pain is not certain. Your condition does not seem serious at this time, and your pain does not appear to be coming from your heart. However, sometimes the signs of a serious problem take more time to appear. Therefore, watch for the warning signs listed below.  HOME CARE:  1. Rest today and avoid strenuous activity.  2. Take any prescribed medicine as directed.  FOLLOW UP with your doctor in 1-3 days.   GET PROMPT MEDICAL ATTENTION if any of the following occur:    A change in the type of pain: if it feels different, becomes more severe, lasts longer, or begins to spread into your shoulder, arm, neck, jaw or back    Shortness of breath or increased pain with breathing    Weakness, dizziness, or fainting    Cough with blood or dark colored sputum (phlegm)    Fever over 101  F (38.3  C)    Swelling, pain or redness in one leg    1643-3537 [employer]. [address] [place] [address]. All rights reserved. This information is not intended as a substitute for professional medical care. Always follow your healthcare professional's instructions.  This information has been modified by your health care provider with permission from the publisher.      24 Hour Appointment Hotline       To make an appointment at any [facility], call [phone] ([phone]). If you don't have a family doctor or clinic, we will help you find one. [place] clinics are conveniently located to serve the needs of you and your family.             Review of your medicines      START taking         Dose / Directions Last dose taken    omeprazole 20 MG CR capsule   Commonly known as:  priLOSEC   Dose:  20 mg   Quantity:  30 capsule        Take 1 capsule (20 mg) by mouth daily   Refills:  0          Our records show that you are taking the medicines listed below. If these are incorrect, please call your family doctor or clinic.        Dose / Directions Last dose taken    darunavir 800 MG tablet   Commonly known as:  PREZISTA   Dose:  800 mg   Quantity:  30 tablet        Take 1 tablet (800 mg) by mouth daily   Refills:  5        emtricitabine-tenofovir -25 MG per tablet   Commonly known as:  DESCOVY   Dose:  1 tablet   Quantity:  30 tablet        Take 1 tablet by mouth daily   Refills:  11        loperamide 2 MG capsule   Commonly known as:  IMODIUM   Dose:  2 mg        Take 2 mg by mouth 4 times daily as needed for diarrhea   Refills:  0        ritonavir 100 MG capsule   Commonly known as:  NORVIR   Dose:  100 mg   Quantity:  30 capsule        Take 1 capsule (100 mg) by mouth daily   Refills:  5        TRAZODONE HCL PO   Dose:  50 mg        Take 50 mg by mouth nightly as needed for sleep   Refills:  0                Prescriptions were sent or printed at these locations (1 Prescription)                   Other Prescriptions                Printed at Department/Unit printer (1 of 1)         omeprazole (PRILOSEC) 20 MG CR capsule                Procedures and tests performed during your visit     CBC with platelets differential    Chest XR,  PA & LAT    Comprehensive metabolic panel    EKG 12 lead    Troponin I      Orders Needing Specimen Collection     None      Pending Results     Date and Time Order Name Status Description    2/5/2018 2002 EKG 12 lead Preliminary             Pending Culture Results     No orders found from 2/3/2018 to 2/6/2018.            Pending Results Instructions     If you had any lab results that were not finalized at the time of your Discharge, you can call the ED Lab Result  RN at [phone]. You will be contacted by this team for any positive Lab results or changes in treatment. The nurses are available 7 days a week from 10A to 6:30P.  You can leave a message 24 hours per day and they will return your call.        Test Results From Your Hospital Stay        2/5/2018  9:17 PM      Narrative     CHEST TWO VIEWS     2/5/2018 9:06 PM     HISTORY: Chest pain.    COMPARISON: None.     FINDINGS: Cardiomediastinal silhouette within normal limits. No  pleural effusion. No pneumothorax identified. No focal airspace  opacities. The bones are unremarkable.         Impression     IMPRESSION: No acute cardiopulmonary abnormalities.     MARILYN KING MD         2/5/2018  9:22 PM      Component Results     Component Value Ref Range & Units Status    WBC 7.1 4.0 - 11.0 10e9/L Final    RBC Count 4.13 (L) 4.4 - 5.9 10e12/L Final    Hemoglobin 14.1 13.3 - 17.7 g/dL Final    Hematocrit 40.4 40.0 - 53.0 % Final    MCV 98 78 - 100 fl Final    MCH 34.1 (H) 26.5 - 33.0 pg Final    MCHC 34.9 31.5 - 36.5 g/dL Final    RDW 12.2 10.0 - 15.0 % Final    Platelet Count 209 150 - 450 10e9/L Final    Diff Method Automated Method  Final    % Neutrophils 58.3 % Final    % Lymphocytes 33.0 % Final    % Monocytes 7.4 % Final    % Eosinophils 0.4 % Final    % Basophils 0.6 % Final    % Immature Granulocytes 0.3 % Final    Nucleated RBCs 0 0 /100 Final    Absolute Neutrophil 4.1 1.6 - 8.3 10e9/L Final    Absolute Lymphocytes 2.3 0.8 - 5.3 10e9/L Final    Absolute Monocytes 0.5 0.0 - 1.3 10e9/L Final    Absolute Eosinophils 0.0 0.0 - 0.7 10e9/L Final    Absolute Basophils 0.0 0.0 - 0.2 10e9/L Final    Abs Immature Granulocytes 0.0 0 - 0.4 10e9/L Final    Absolute Nucleated RBC 0.0  Final         2/5/2018  9:40 PM      Component Results     Component Value Ref Range & Units Status    Sodium 138 133 - 144 mmol/L Final    Potassium 3.8 3.4 - 5.3 mmol/L Final    Chloride 105 94 - 109 mmol/L Final    Carbon Dioxide 25 20 - 32  mmol/L Final    Anion Gap 8 3 - 14 mmol/L Final    Glucose 84 70 - 99 mg/dL Final    Urea Nitrogen 20 7 - 30 mg/dL Final    Creatinine 1.19 0.66 - 1.25 mg/dL Final    GFR Estimate 68 >60 mL/min/1.7m2 Final    Non  GFR Calc    GFR Estimate If Black 82 >60 mL/min/1.7m2 Final    African American GFR Calc    Calcium 8.6 8.5 - 10.1 mg/dL Final    Bilirubin Total 0.7 0.2 - 1.3 mg/dL Final    Albumin 4.2 3.4 - 5.0 g/dL Final    Protein Total 7.5 6.8 - 8.8 g/dL Final    Alkaline Phosphatase 77 40 - 150 U/L Final    ALT 16 0 - 70 U/L Final    AST 13 0 - 45 U/L Final         2/5/2018  9:40 PM      Component Results     Component Value Ref Range & Units Status    Troponin I ES <0.015 0.000 - 0.045 ug/L Final    The 99th percentile for upper reference range is 0.045 ug/L.  Troponin values   in the range of 0.045 - 0.120 ug/L may be associated with risks of adverse   clinical events.                  Clinical Quality Measure: Blood Pressure Screening     Your blood pressure was checked while you were in the emergency department today. The last reading we obtained was  BP: 118/75 . Please read the guidelines below about what these numbers mean and what you should do about them.  If your systolic blood pressure (the top number) is less than 120 and your diastolic blood pressure (the bottom number) is less than 80, then your blood pressure is normal. There is nothing more that you need to do about it.  If your systolic blood pressure (the top number) is 120-139 or your diastolic blood pressure (the bottom number) is 80-89, your blood pressure may be higher than it should be. You should have your blood pressure rechecked within a year by a primary care provider.  If your systolic blood pressure (the top number) is 140 or greater or your diastolic blood pressure (the bottom number) is 90 or greater, you may have high blood pressure. High blood pressure is treatable, but if left untreated over time it can put you at  "risk for heart attack, stroke, or kidney failure. You should have your blood pressure rechecked by a primary care provider within the next 4 weeks.  If your provider in the emergency department today gave you specific instructions to follow-up with your doctor or provider even sooner than that, you should follow that instruction and not wait for up to 4 weeks for your follow-up visit.        Thank you for choosing [facility]       Thank you for choosing [facility] for your care. Our goal is always to provide you with excellent care. Hearing back from our patients is one way we can continue to improve our services. Please take a few minutes to complete the written survey that you may receive in the mail after you visit with us. Thank you!        [employer] Information     [employer] lets you send messages to your doctor, view your test results, renew your prescriptions, schedule appointments and more. To sign up, go to [email or web address] . Click on \"Log in\" on the left side of the screen, which will take you to the Welcome page. Then click on \"Sign up Now\" on the right side of the page.     You will be asked to enter the access code listed below, as well as some personal information. Please follow the directions to create your username and password.     Your access code is: [email or web address]  Expires: 2018 10:06 PM     Your access code will  in 90 days. If you need help or a new code, please call your [facility] clinic or [phone].        Care EveryWhere ID     This is your Care EveryWhere ID. This could be used by other organizations to access your [facility] medical records  [id number]        Equal Access to Services     [facility] CenterMARCUS : Hadii julio c hameed Sobrittanie, waaxda luqadaha, qaybta kaalvignesh bolden . So [facility] [phone].    ATENCIÓN: Si habla español, tiene a ariza disposición servicios gratuitos de asistencia lingüística. Llame al [phone].    We comply with " applicable federal civil rights laws and [place] laws. We do not discriminate on the basis of race, color, national origin, age, disability, sex, sexual orientation, or gender identity.            After Visit Summary       This is your record. Keep this with you and show to your community pharmacist(s) and doctor(s) at your next visit.

## 2018-02-05 NOTE — ED AVS SNAPSHOT
[facility] Emergency Department    [address]    [facility] [address]    Phone:  [phone]    Fax:  [phone]                                       Reynaldo Branch   MRN: [id number]    Department:  [facility] Emergency Department   Date of Visit:  2/5/2018           After Visit Summary Signature Page     I have received my discharge instructions, and my questions have been answered. I have discussed any challenges I see with this plan with the nurse or doctor.    ..........................................................................................................................................  Patient/Patient Representative Signature      ..........................................................................................................................................  Patient Representative Print Name and Relationship to Patient    ..................................................               ................................................  Date                                            Time    ..........................................................................................................................................  Reviewed by Signature/Title    ...................................................              ..............................................  Date                                                            Time

## 2018-02-06 LAB — INTERPRETATION ECG - MUSE: NORMAL

## 2018-02-06 NOTE — DISCHARGE INSTRUCTIONS
"  GERD (Adult)    The esophagus is a tube that carries food from the mouth to the stomach. A valve at the lower end of the esophagus prevents stomach acid from flowing upward. When this valve doesn't work properly, stomach contents may repeatedly flow back up (reflux) into the esophagus. This is called gastroesophageal reflux disease (GERD). GERD can irritate the esophagus. It can cause problems with swallowing or breathing. In severe cases, GERD can cause recurrent pneumonia or other serious problems.  Symptoms of reflux include burning, pressure or sharp pain in the upper abdomen or mid to lower chest. The pain can spread to the neck, back, or shoulder. There may be belching, an acid taste in the back of the throat, chronic cough, or sore throat or hoarseness. GERD symptoms often occur during the day after a big meal. They can also occur at night when lying down.   Home care  Lifestyle changes can help reduce symptoms. If needed, medicines may be prescribed. Symptoms often improve with treatment, but if treatment is stopped, the symptoms often return after a few months. So most persons with GERD will need to continue treatment.  Lifestyle changes    Limit or avoid fatty, fried, and spicy foods, as well as coffee, chocolate, mint, and foods with high acid content such as tomatoes and citrus fruit and juices (orange, grapefruit, lemon).    Don t eat large meals, especially at night. Frequent, smaller meals are best. Do not lie down right after eating. And don t eat anything 3 hours before going to bed.    Avoid drinking alcohol and smoking. As much as possible, stay away from second hand smoke.    If you are overweight, losing weight will reduce symptoms.     Avoid wearing tight clothing around your stomach area.    If your symptoms occur during sleep, use a foam wedge to elevate your upper body (not just your head.) Or, place 4\" blocks under the head of your bed.  Medicines  If needed, medicines can help relieve " the symptoms of GERD and prevent damage to the esophagus. Discuss a medicine plan with your healthcare provider. This may include one or more of the following medicines:    Antacids to help neutralize the normal acids in your stomach.    Acid blockers (H2 blockers) to decrease acid production.    Acid inhibitors (PPIs) to decrease acid production in a different way than the blockers. They may work better, but can take a little longer to take effect.  Take an antacid 30-60 minutes after eating and at bedtime, but not at the same time as an acid blocker.  Try not to take medicines such as ibuprofen and aspirin. If you are taking aspirin for your heart or other medical reasons, talk to your healthcare provider about stopping it.  Follow-up care  Follow up with your healthcare provider or as advised by our staff.  When to seek medical advice  Call your healthcare provider if any of the following occur:    Stomach pain gets worse or moves to the lower right abdomen (appendix area)    Chest pain appears or gets worse, or spreads to the back, neck, shoulder, or arm    Frequent vomiting (can t keep down liquids)    Blood in the stool or vomit (red or black in color)    Feeling weak or dizzy    Fever of 100.4 F (38 C) or higher, or as directed by your healthcare provider  Date Last Reviewed: 6/23/2015 2000-2017 [employer]. [address] [place] [address], [place] [address]. All rights reserved. This information is not intended as a substitute for professional medical care. Always follow your healthcare professional's instructions.         *CHEST PAIN, UNCERTAIN CAUSE    Based on your exam today, the exact cause of your chest pain is not certain. Your condition does not seem serious at this time, and your pain does not appear to be coming from your heart. However, sometimes the signs of a serious problem take more time to appear. Therefore, watch for the warning signs listed below.  HOME CARE:  1. Rest today and  avoid strenuous activity.  2. Take any prescribed medicine as directed.  FOLLOW UP with your doctor in 1-3 days.   GET PROMPT MEDICAL ATTENTION if any of the following occur:    A change in the type of pain: if it feels different, becomes more severe, lasts longer, or begins to spread into your shoulder, arm, neck, jaw or back    Shortness of breath or increased pain with breathing    Weakness, dizziness, or fainting    Cough with blood or dark colored sputum (phlegm)    Fever over 101  F (38.3  C)    Swelling, pain or redness in one leg    [id number] [employer]. [address] [place], [place] [address]. All rights reserved. This information is not intended as a substitute for professional medical care. Always follow your healthcare professional's instructions.  This information has been modified by your health care provider with permission from the publisher.

## 2018-02-06 NOTE — ED NOTES
Patient presents to ER for sharp intermittent chest pain that worsens with deep breathing & laying down for 6 months. Patient reports family history of lung cancer. ABC's intact.

## 2018-02-06 NOTE — ED PROVIDER NOTES
History     Chief Complaint:  Pleurisy    HPI   Reynaldo Branch is a 37 year old male with a past history of drug abuse and smoking cigarettes who presents to the emergency department for evaluation of pleurisy. The patient reports that he has been sober from drugs for several years and that he quit smoking six months ago. He recalls noticing worsening of chest pain after he quit smoking. The pain comes intermittently when he is laying down, and is described as a burning pain which occasionally becomes sharp and stabbing. This past week the patient noticed that his episodes of chest pain became more frequent and he is waking up several times during the time due to the pain and inability to catch his breath. He reports that the pain used to typically resolve on its own within a few minutes, but today he experienced pain that lasted a few hours. He denies any fever or flu-like symptoms, recent long car rides, or ill contacts. The patient currently feels comfortable and pain-free while sitting up in bed here in the emergency department. Of note, the patient has a family history of lung cancer.    Allergies:  Allergies reviewed. No pertinent allergies.     Medications:    Imodium  Trazodone  Descovy  Prezista  Norvir    Past Medical History:    Asymptomatic human immunodeficiency virus (HIV) infection status    Bipolar 2 disorder    Depressive disorder   HIV (human immunodeficiency virus infection)    PTSD (post-traumatic stress disorder)     Past Surgical History:    History reviewed. No pertinent surgical history.    Family History:    Family history reviewed. No pertinent family history.    Social History:  The patient was accompanied to the emergency department by a significant other.  Smoking Status: Former Smoker (0.5 pack/day)  Smokeless Tobacco: Never Used  Alcohol Use: No  Marital Status: Single     Review of Systems   Constitutional: Negative for fever.   Respiratory: Positive for shortness of breath.  "Negative for cough.    Cardiovascular: Positive for chest pain.   All other systems reviewed and are negative.    Physical Exam     Patient Vitals for the past 24 hrs:   BP Temp Temp src Pulse Resp SpO2 Height Weight   02/05/18 1935 118/75 99.6  F (37.6  C) Oral 75 18 97 % 1.803 m (5' 11\") 86.2 kg (190 lb)     Physical Exam   Constitutional: He appears well-developed and well-nourished.   HENT:   Right Ear: External ear normal.   Left Ear: External ear normal.   Mouth/Throat: Oropharynx is clear and moist. No oropharyngeal exudate.   TM's clear bilaterally   Eyes: Conjunctivae are normal. Pupils are equal, round, and reactive to light. No scleral icterus.   Neck: Normal range of motion. Neck supple.   Cardiovascular: Normal rate, regular rhythm, normal heart sounds and intact distal pulses.  Exam reveals no gallop and no friction rub.    No murmur heard.  Pulmonary/Chest: Effort normal and breath sounds normal. No respiratory distress. He has no wheezes. He has no rales.   Abdominal: Soft. Bowel sounds are normal. He exhibits no distension and no mass. There is no tenderness.   Musculoskeletal: He exhibits no edema.   Neurological: He is alert.   Skin: Skin is warm and dry. No rash noted.   Psychiatric: He has a normal mood and affect.         Emergency Department Course     ECG:  ECG taken at 1928, ECG read at 1930  Normal sinus rhythm  Normal ECG  Rate 71 bpm. NY interval 204 ms. QRS duration 92 ms. QT/QTc 416/452 ms. P-R-T axes 24 31 23.    Imaging:  Radiology findings were communicated with the patient who voiced understanding of the findings.    Chest XR,  PA & LAT  No acute cardiopulmonary abnormalities.   Reading per radiology.     Laboratory:  Laboratory findings were communicated with the patient who voiced understanding of the findings.    CBC: WBC 7.1, HGB 14.1,   CMP: WNL (Creatinine 1.19)  Troponin (Collected 2108): <0.015     Emergency Department Course:     Nursing notes and vitals " reviewed.     I performed an exam of the patient as documented above.     IV was inserted and blood was drawn for laboratory testing, results above.     The patient was sent for a chest x-ray while in the emergency department, results above.    I personally reviewed the laboratory, imaging, and EKG results with the patient and answered all related questions prior to discharge.    Impression & Plan      Medical Decision Making:  Reynaldo Branch is a 37 year old male who presents to the emergency department today with significant other for several months of intermittent chest pain that has gone a little longer tonight. He does report that it wakes him up at night when he is sleeping. He does report histories of burning as well as reflux symptoms. His labs here are unremarkable. He was reassured, and I felt this could be anxiety versus reflux. I did give him instructions on dietary changes. He does have HIV although he does have a doctor that takes care of that, and does not currently have any issues with regards to it. The patient is asked to start Prilosec as well and follow up with his doctor. I do not that at this point the patient needs any further evaluation with the chronic chest pain. The patient was comfortable following up and is reassured that if symptoms worsen he should return to the ER.    Diagnosis:  1. Atypical chest pain  2. Possible GERD    Disposition:   The patient was discharged home.     Discharge Medications:  Discharge Medication List as of 2/5/2018 10:06 PM      START taking these medications    Details   omeprazole (PRILOSEC) 20 MG CR capsule Take 1 capsule (20 mg) by mouth daily, Disp-30 capsule, R-0, Local Print             Scribe Disclosure:  I, Madeleine Jim, am serving as a scribe at 8:48 PM on 2/5/2018 to document services personally performed by Keysha Lawrence MD based on my observations and the provider's statements to me.    [facility] EMERGENCY DEPARTMENT       Howard  MD Keysha  02/05/18 [id number]

## 2018-02-06 NOTE — ED NOTES
Went to room to discharge patient and give him his prescription and he was gone from the room and his gown was laying on the cart. No personal belongings were left in room. Pt was not found in triage.

## 2018-02-22 DIAGNOSIS — R19.7 DIARRHEA: Primary | ICD-10-CM

## 2018-02-22 RX ORDER — LOPERAMIDE HYDROCHLORIDE 2 MG/1
TABLET ORAL
Qty: 30 TABLET | Refills: 5 | Status: SHIPPED | OUTPATIENT
Start: 2018-02-22 | End: 2019-03-15

## 2018-03-26 DIAGNOSIS — Z21 HIV (HUMAN IMMUNODEFICIENCY VIRUS INFECTION) (H): Primary | ICD-10-CM

## 2018-06-05 ENCOUNTER — TELEPHONE (OUTPATIENT)
Dept: INFECTIOUS DISEASES | Facility: CLINIC | Age: 38
End: 2018-06-05

## 2018-06-05 NOTE — TELEPHONE ENCOUNTER
Called pt who reports [facility] and [facility]. Dr Ramirez was updated and recommends his wife continue the truvada and that he keep his Viral load at 0 or as close to zero as he can. Pt reports his wife was also tested for HIV [facility]. Pt reports his wife will go see the clinic she is getting Prep from to get referred to an OBGYN.  Karla Sood RN

## 2018-06-05 NOTE — TELEPHONE ENCOUNTER
[facility] Call Center    Phone Message    May a detailed message be left on voicemail: yes    Reason for Call: Other: Pt need to speak to Dr. Ramirez or a nurse about situation. B20 pt wife pregnant.      Action Taken: Message routed to:  [facility] ([facility]): Infectious Disease

## 2018-10-03 DIAGNOSIS — B20 HUMAN IMMUNODEFICIENCY VIRUS (HIV) DISEASE (H): ICD-10-CM

## 2018-11-07 DIAGNOSIS — B20 HUMAN IMMUNODEFICIENCY VIRUS (HIV) DISEASE (H): ICD-10-CM

## 2018-11-19 DIAGNOSIS — Z21 HIV (HUMAN IMMUNODEFICIENCY VIRUS INFECTION) (H): Primary | ICD-10-CM

## 2018-11-19 NOTE — PROGRESS NOTES
Per [facility] Ambulatory Care Protocol, Pt is due for routine labs based on disease state or monitoring of medications. Lab orders entered per clinic protocol.  Alem Torres RN

## 2018-11-24 DIAGNOSIS — Z21 HIV (HUMAN IMMUNODEFICIENCY VIRUS INFECTION) (H): ICD-10-CM

## 2018-11-24 LAB
ALBUMIN SERPL-MCNC: 4.2 G/DL (ref 3.4–5)
ALP SERPL-CCNC: 74 U/L (ref 40–150)
ALT SERPL W P-5'-P-CCNC: 18 U/L (ref 0–70)
ANION GAP SERPL CALCULATED.3IONS-SCNC: 7 MMOL/L (ref 3–14)
AST SERPL W P-5'-P-CCNC: 14 U/L (ref 0–45)
BASOPHILS # BLD AUTO: 0 10E9/L (ref 0–0.2)
BASOPHILS NFR BLD AUTO: 0.2 %
BILIRUB SERPL-MCNC: 0.4 MG/DL (ref 0.2–1.3)
BUN SERPL-MCNC: 17 MG/DL (ref 7–30)
CALCIUM SERPL-MCNC: 8.5 MG/DL (ref 8.5–10.1)
CD3 CELLS # BLD: 1183 CELLS/UL (ref 603–2990)
CD3 CELLS NFR BLD: 84 % (ref 49–84)
CD3+CD4+ CELLS # BLD: 593 CELLS/UL (ref 441–2156)
CD3+CD4+ CELLS NFR BLD: 42 % (ref 28–63)
CD3+CD4+ CELLS/CD3+CD8+ CLL BLD: 1.02 % (ref 1.4–2.6)
CD3+CD8+ CELLS # BLD: 577 CELLS/UL (ref 125–1312)
CD3+CD8+ CELLS NFR BLD: 41 % (ref 10–40)
CHLORIDE SERPL-SCNC: 110 MMOL/L (ref 94–109)
CO2 SERPL-SCNC: 25 MMOL/L (ref 20–32)
CREAT SERPL-MCNC: 1.18 MG/DL (ref 0.66–1.25)
DIFFERENTIAL METHOD BLD: ABNORMAL
EOSINOPHIL # BLD AUTO: 0.1 10E9/L (ref 0–0.7)
EOSINOPHIL NFR BLD AUTO: 1.4 %
ERYTHROCYTE [DISTWIDTH] IN BLOOD BY AUTOMATED COUNT: 12.2 % (ref 10–15)
GFR SERPL CREATININE-BSD FRML MDRD: 68 ML/MIN/1.7M2
GLUCOSE SERPL-MCNC: 96 MG/DL (ref 70–99)
HCT VFR BLD AUTO: 42 % (ref 40–53)
HGB BLD-MCNC: 14.5 G/DL (ref 13.3–17.7)
IFC SPECIMEN: ABNORMAL
IMM GRANULOCYTES # BLD: 0 10E9/L (ref 0–0.4)
IMM GRANULOCYTES NFR BLD: 0.2 %
LYMPHOCYTES # BLD AUTO: 1.4 10E9/L (ref 0.8–5.3)
LYMPHOCYTES NFR BLD AUTO: 27.7 %
MCH RBC QN AUTO: 33.8 PG (ref 26.5–33)
MCHC RBC AUTO-ENTMCNC: 34.5 G/DL (ref 31.5–36.5)
MCV RBC AUTO: 98 FL (ref 78–100)
MONOCYTES # BLD AUTO: 0.3 10E9/L (ref 0–1.3)
MONOCYTES NFR BLD AUTO: 6.2 %
NEUTROPHILS # BLD AUTO: 3.1 10E9/L (ref 1.6–8.3)
NEUTROPHILS NFR BLD AUTO: 64.3 %
NRBC # BLD AUTO: 0 10*3/UL
NRBC BLD AUTO-RTO: 0 /100
PLATELET # BLD AUTO: 173 10E9/L (ref 150–450)
POTASSIUM SERPL-SCNC: 4.2 MMOL/L (ref 3.4–5.3)
PROT SERPL-MCNC: 7.6 G/DL (ref 6.8–8.8)
RBC # BLD AUTO: 4.29 10E12/L (ref 4.4–5.9)
SODIUM SERPL-SCNC: 141 MMOL/L (ref 133–144)
WBC # BLD AUTO: 4.9 10E9/L (ref 4–11)

## 2018-11-24 PROCEDURE — 86803 HEPATITIS C AB TEST: CPT | Performed by: INTERNAL MEDICINE

## 2018-11-24 PROCEDURE — 86360 T CELL ABSOLUTE COUNT/RATIO: CPT | Performed by: INTERNAL MEDICINE

## 2018-11-24 PROCEDURE — 86480 TB TEST CELL IMMUN MEASURE: CPT | Performed by: INTERNAL MEDICINE

## 2018-11-24 PROCEDURE — 86359 T CELLS TOTAL COUNT: CPT | Performed by: INTERNAL MEDICINE

## 2018-11-24 PROCEDURE — 87536 HIV-1 QUANT&REVRSE TRNSCRPJ: CPT | Performed by: INTERNAL MEDICINE

## 2018-11-26 LAB
GAMMA INTERFERON BACKGROUND BLD IA-ACNC: 0.05 IU/ML
HCV AB SERPL QL IA: NONREACTIVE
M TB IFN-G BLD-IMP: NEGATIVE
M TB IFN-G CD4+ BCKGRND COR BLD-ACNC: 7.96 IU/ML
MITOGEN IGNF BCKGRD COR BLD-ACNC: 0 IU/ML
MITOGEN IGNF BCKGRD COR BLD-ACNC: 0 IU/ML

## 2018-11-27 LAB
HIV1 RNA # PLAS NAA DL=20: NORMAL {COPIES}/ML
HIV1 RNA SERPL NAA+PROBE-LOG#: NORMAL {LOG_COPIES}/ML

## 2018-11-28 ENCOUNTER — OFFICE VISIT (OUTPATIENT)
Dept: INFECTIOUS DISEASES | Facility: CLINIC | Age: 38
End: 2018-11-28
Attending: INTERNAL MEDICINE
Payer: MEDICAID

## 2018-11-28 VITALS
BODY MASS INDEX: 29.57 KG/M2 | HEART RATE: 65 BPM | SYSTOLIC BLOOD PRESSURE: 118 MMHG | WEIGHT: 212 LBS | TEMPERATURE: 97.9 F | OXYGEN SATURATION: 95 % | DIASTOLIC BLOOD PRESSURE: 56 MMHG

## 2018-11-28 DIAGNOSIS — Z21 HUMAN IMMUNODEFICIENCY VIRUS I INFECTION (H): Primary | ICD-10-CM

## 2018-11-28 DIAGNOSIS — Z21 HIV (HUMAN IMMUNODEFICIENCY VIRUS INFECTION) (H): ICD-10-CM

## 2018-11-28 DIAGNOSIS — B20 HUMAN IMMUNODEFICIENCY VIRUS (HIV) DISEASE (H): ICD-10-CM

## 2018-11-28 DIAGNOSIS — R07.89 ATYPICAL CHEST PAIN: ICD-10-CM

## 2018-11-28 DIAGNOSIS — K21.00 GASTROESOPHAGEAL REFLUX DISEASE WITH ESOPHAGITIS: ICD-10-CM

## 2018-11-28 PROCEDURE — G0463 HOSPITAL OUTPT CLINIC VISIT: HCPCS | Mod: 25,ZF

## 2018-11-28 PROCEDURE — 93010 ELECTROCARDIOGRAM REPORT: CPT | Performed by: INTERNAL MEDICINE

## 2018-11-28 PROCEDURE — 93005 ELECTROCARDIOGRAM TRACING: CPT | Mod: ZF

## 2018-11-28 RX ORDER — OMEPRAZOLE 40 MG/1
40 CAPSULE, DELAYED RELEASE ORAL AT BEDTIME
Qty: 30 CAPSULE | Refills: 1 | Status: SHIPPED | OUTPATIENT
Start: 2018-11-28 | End: 2018-11-29

## 2018-11-28 ASSESSMENT — PAIN SCALES - GENERAL: PAINLEVEL: NO PAIN (0)

## 2018-11-28 NOTE — MR AVS SNAPSHOT
"              After Visit Summary   2018    Reynaldo Branch    MRN: [id number]           Patient Information     Date Of Birth          1980        Visit Information        Provider Department      2018 5:30 PM Yury Ramirez MD [facility] Infectious Diseases        Today's Diagnoses     Gastroesophageal reflux disease with esophagitis    -  1    Atypical chest pain           Follow-ups after your visit        Follow-up notes from your care team     Return in about 2 months (around 2019).      Who to contact     If you have questions or need follow up information about today's clinic visit or your schedule please contact [facility] directly at [phone].  Normal or non-critical lab and imaging results will be communicated to you by MyChart, letter or phone within 4 business days after the clinic has received the results. If you do not hear from us within 7 days, please contact the clinic through MyChart or phone. If you have a critical or abnormal lab result, we will notify you by phone as soon as possible.  Submit refill requests through [employer] or call your pharmacy and they will forward the refill request to us. Please allow 3 business days for your refill to be completed.          Additional Information About Your Visit        MyChart Information     [employer] lets you send messages to your doctor, view your test results, renew your prescriptions, schedule appointments and more. To sign up, go to [email or web address] . Click on \"Log in\" on the left side of the screen, which will take you to the Welcome page. Then click on \"Sign up Now\" on the right side of the page.     You will be asked to enter the access code listed below, as well as some personal information. Please follow the directions to create your username and password.     Your access code is: [email or web address]  Expires: 2019  6:30 AM     Your access code will  " in 90 days. If you need help or a new code, please call your [facility] clinic or [phone].        Care EveryWhere ID     This is your Care EveryWhere ID. This could be used by other organizations to access your [facility] medical records  [id number]        Your Vitals Were     Pulse Temperature Pulse Oximetry BMI (Body Mass Index)          65 97.9  F (36.6  C) (Oral) 95% 29.57 kg/m2         Blood Pressure from Last 3 Encounters:   11/28/18 118/56   02/05/18 118/75   09/27/17 125/71    Weight from Last 3 Encounters:   11/28/18 96.2 kg (212 lb)   02/05/18 86.2 kg (190 lb)   09/27/17 82.3 kg (181 lb 6.4 oz)              We Performed the Following     EKG 12-lead, complete          Today's Medication Changes          These changes are accurate as of 11/28/18 11:59 PM.  If you have any questions, ask your nurse or doctor.               Start taking these medicines.        Dose/Directions    omeprazole 40 MG DR capsule   Commonly known as:  priLOSEC   Used for:  Gastroesophageal reflux disease with esophagitis   Started by:  Yury Ramirez MD        Dose:  40 mg   Take 1 capsule (40 mg) by mouth At Bedtime   Quantity:  30 capsule   Refills:  1            Where to get your medicines      These medications were sent to [facility] [address] [place] [address] AT [address] [address]     Phone:  [phone]     omeprazole 40 MG DR capsule                Primary Care Provider Office Phone # Fax #    Yury Ramirez [phone]       [address] [place] [address]        Equal Access to Services     LUIS MILLAN AH: Harinder Palencia, frandy rowland, le kaalmavignesh mcfadden. So [facility] [phone].    ATENCIÓN: Si habla español, tiene a ariza disposición servicios gratuitos de asistencia lingüística. Llame al [phone].    We comply with applicable federal  civil rights laws and [place] laws. We do not discriminate on the basis of race, color, national origin, age, disability, sex, sexual orientation, or gender identity.            Thank you!     Thank you for choosing [facility]  for your care. Our goal is always to provide you with excellent care. Hearing back from our patients is one way we can continue to improve our services. Please take a few minutes to complete the written survey that you may receive in the mail after your visit with us. Thank you!             Your Updated Medication List - Protect others around you: Learn how to safely use, store and throw away your medicines at www.disposemymeds.org.          This list is accurate as of 11/28/18 11:59 PM.  Always use your most recent med list.                   Brand Name Dispense Instructions for use Diagnosis    darunavir 800 MG tablet    PREZISTA    90 tablet    Take 1 tablet (800 mg) by mouth daily    HIV (human immunodeficiency virus infection) (H)       emtricitabine-tenofovir -25 MG per tablet    DESCOVY    30 tablet    Take 1 tablet by mouth daily    Human immunodeficiency virus (HIV) disease (H)       * loperamide 2 MG capsule    IMODIUM     Take 2 mg by mouth 4 times daily as needed for diarrhea        * loperamide 2 MG tablet    IMODIUM A-D    30 tablet    May use one or two tablets daily or twice daily as needed for diarrhea.    Diarrhea       omeprazole 40 MG DR capsule    priLOSEC    30 capsule    Take 1 capsule (40 mg) by mouth At Bedtime    Gastroesophageal reflux disease with esophagitis       ritonavir 100 MG capsule    NORVIR    90 capsule    Take 1 capsule (100 mg) by mouth daily    HIV (human immunodeficiency virus infection) (H)       TRAZODONE HCL PO      Take 50 mg by mouth nightly as needed for sleep        * Notice:  This list has 2 medication(s) that are the same as other medications prescribed for you. Read the directions carefully, and  ask your doctor or other care provider to review them with you.

## 2018-11-28 NOTE — NURSING NOTE
Chief Complaint   Patient presents with     RECHECK     B20     /56  Pulse 65  Temp 97.9  F (36.6  C) (Oral)  Wt 96.2 kg (212 lb)  SpO2 95%  BMI 29.57 kg/m2  Aida Fisher MA

## 2018-11-29 DIAGNOSIS — K21.00 GASTROESOPHAGEAL REFLUX DISEASE WITH ESOPHAGITIS: ICD-10-CM

## 2018-11-29 LAB — INTERPRETATION ECG - MUSE: NORMAL

## 2018-11-29 RX ORDER — OMEPRAZOLE 40 MG/1
40 CAPSULE, DELAYED RELEASE ORAL AT BEDTIME
Qty: 90 CAPSULE | Refills: 0 | Status: SHIPPED | OUTPATIENT
Start: 2018-11-29 | End: 2019-05-16
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
## 2019-01-18 ENCOUNTER — TELEPHONE (OUTPATIENT)
Dept: INFECTIOUS DISEASES | Facility: CLINIC | Age: 39
End: 2019-01-18

## 2019-01-18 NOTE — TELEPHONE ENCOUNTER
[facility] Call Center    Phone Message    May a detailed message be left on voicemail: yes    Reason for Call: Other: PT called to discuss question with nurse regarding Wife. Wife is on Truvada and they are wondering if they are safe to breast feed. Please call Reynaldo DE ANDA at [phone]     Action Taken: Message routed to:  [facility] ([facility]): ID

## 2019-01-18 NOTE — TELEPHONE ENCOUNTER
PT called again to discuss question with nurse regarding Wife. Wife is on Truvada and they are wondering if they are safe to breast feed. [facility] [phone]

## 2019-01-18 NOTE — TELEPHONE ENCOUNTER
Dr Ramirez notified via Text Page that pt would like him to call. Pt at hospital wondering if it is OK for mom to breast feel while on Truvada for PREP.  Karla Sood RN

## 2019-03-15 DIAGNOSIS — R19.7 DIARRHEA: ICD-10-CM

## 2019-03-15 RX ORDER — LOPERAMIDE HYDROCHLORIDE 2 MG/1
TABLET ORAL
Qty: 30 TABLET | Refills: 5 | Status: SHIPPED | OUTPATIENT
Start: 2019-03-15 | End: 2019-12-03

## 2019-04-24 ENCOUNTER — OFFICE VISIT (OUTPATIENT)
Dept: INFECTIOUS DISEASES | Facility: CLINIC | Age: 39
End: 2019-04-24
Attending: INTERNAL MEDICINE
Payer: MEDICAID

## 2019-04-24 VITALS
SYSTOLIC BLOOD PRESSURE: 132 MMHG | WEIGHT: 209.3 LBS | BODY MASS INDEX: 29.3 KG/M2 | HEIGHT: 71 IN | TEMPERATURE: 98.1 F | HEART RATE: 65 BPM | DIASTOLIC BLOOD PRESSURE: 69 MMHG

## 2019-04-24 DIAGNOSIS — K21.9 GASTROESOPHAGEAL REFLUX DISEASE WITHOUT ESOPHAGITIS: ICD-10-CM

## 2019-04-24 DIAGNOSIS — B20 HUMAN IMMUNODEFICIENCY VIRUS (HIV) DISEASE (H): ICD-10-CM

## 2019-04-24 DIAGNOSIS — G25.81 RESTLESS LEG: ICD-10-CM

## 2019-04-24 DIAGNOSIS — Z21 HUMAN IMMUNODEFICIENCY VIRUS I INFECTION (H): Primary | ICD-10-CM

## 2019-04-24 DIAGNOSIS — K52.9 CHRONIC DIARRHEA: ICD-10-CM

## 2019-04-24 LAB
ALBUMIN SERPL-MCNC: 4.4 G/DL (ref 3.4–5)
ALP SERPL-CCNC: 79 U/L (ref 40–150)
ALT SERPL W P-5'-P-CCNC: 20 U/L (ref 0–70)
ANION GAP SERPL CALCULATED.3IONS-SCNC: 5 MMOL/L (ref 3–14)
AST SERPL W P-5'-P-CCNC: 21 U/L (ref 0–45)
BASOPHILS # BLD AUTO: 0 10E9/L (ref 0–0.2)
BASOPHILS NFR BLD AUTO: 0.4 %
BILIRUB SERPL-MCNC: 0.5 MG/DL (ref 0.2–1.3)
BUN SERPL-MCNC: 17 MG/DL (ref 7–30)
CALCIUM SERPL-MCNC: 8.9 MG/DL (ref 8.5–10.1)
CHLORIDE SERPL-SCNC: 109 MMOL/L (ref 94–109)
CO2 SERPL-SCNC: 25 MMOL/L (ref 20–32)
CREAT SERPL-MCNC: 1.44 MG/DL (ref 0.66–1.25)
DIFFERENTIAL METHOD BLD: ABNORMAL
EOSINOPHIL # BLD AUTO: 0 10E9/L (ref 0–0.7)
EOSINOPHIL NFR BLD AUTO: 0.3 %
ERYTHROCYTE [DISTWIDTH] IN BLOOD BY AUTOMATED COUNT: 12.3 % (ref 10–15)
GFR SERPL CREATININE-BSD FRML MDRD: 60 ML/MIN/{1.73_M2}
GLUCOSE SERPL-MCNC: 90 MG/DL (ref 70–99)
HCT VFR BLD AUTO: 40.4 % (ref 40–53)
HGB BLD-MCNC: 14.3 G/DL (ref 13.3–17.7)
IMM GRANULOCYTES # BLD: 0 10E9/L (ref 0–0.4)
IMM GRANULOCYTES NFR BLD: 0.3 %
LIPASE SERPL-CCNC: 111 U/L (ref 73–393)
LYMPHOCYTES # BLD AUTO: 1.7 10E9/L (ref 0.8–5.3)
LYMPHOCYTES NFR BLD AUTO: 24.1 %
MCH RBC QN AUTO: 34.5 PG (ref 26.5–33)
MCHC RBC AUTO-ENTMCNC: 35.4 G/DL (ref 31.5–36.5)
MCV RBC AUTO: 97 FL (ref 78–100)
MONOCYTES # BLD AUTO: 0.4 10E9/L (ref 0–1.3)
MONOCYTES NFR BLD AUTO: 5.7 %
NEUTROPHILS # BLD AUTO: 4.9 10E9/L (ref 1.6–8.3)
NEUTROPHILS NFR BLD AUTO: 69.2 %
NRBC # BLD AUTO: 0 10*3/UL
NRBC BLD AUTO-RTO: 0 /100
PLATELET # BLD AUTO: 181 10E9/L (ref 150–450)
POTASSIUM SERPL-SCNC: 4.1 MMOL/L (ref 3.4–5.3)
PROT SERPL-MCNC: 7.5 G/DL (ref 6.8–8.8)
RBC # BLD AUTO: 4.15 10E12/L (ref 4.4–5.9)
SODIUM SERPL-SCNC: 139 MMOL/L (ref 133–144)
WBC # BLD AUTO: 7.1 10E9/L (ref 4–11)

## 2019-04-24 PROCEDURE — 0064U ANTB TP TOTAL&RPR IA QUAL: CPT | Performed by: INTERNAL MEDICINE

## 2019-04-24 PROCEDURE — 87536 HIV-1 QUANT&REVRSE TRNSCRPJ: CPT | Performed by: INTERNAL MEDICINE

## 2019-04-24 PROCEDURE — G0463 HOSPITAL OUTPT CLINIC VISIT: HCPCS | Mod: ZF

## 2019-04-24 PROCEDURE — 0065U SYFLS TST NONTREPONEMAL ANTB: CPT | Performed by: INTERNAL MEDICINE

## 2019-04-24 PROCEDURE — 80053 COMPREHEN METABOLIC PANEL: CPT

## 2019-04-24 PROCEDURE — 83690 ASSAY OF LIPASE: CPT

## 2019-04-24 PROCEDURE — 85025 COMPLETE CBC W/AUTO DIFF WBC: CPT

## 2019-04-24 PROCEDURE — 86780 TREPONEMA PALLIDUM: CPT | Performed by: INTERNAL MEDICINE

## 2019-04-24 PROCEDURE — 36415 COLL VENOUS BLD VENIPUNCTURE: CPT | Performed by: INTERNAL MEDICINE

## 2019-04-24 PROCEDURE — 86359 T CELLS TOTAL COUNT: CPT | Performed by: INTERNAL MEDICINE

## 2019-04-24 PROCEDURE — 86360 T CELL ABSOLUTE COUNT/RATIO: CPT | Performed by: INTERNAL MEDICINE

## 2019-04-24 RX ORDER — GABAPENTIN 300 MG/1
300 CAPSULE ORAL AT BEDTIME
Qty: 90 CAPSULE | Refills: 3 | Status: SHIPPED | OUTPATIENT
Start: 2019-04-24 | End: 2021-09-29

## 2019-04-24 ASSESSMENT — PAIN SCALES - GENERAL: PAINLEVEL: NO PAIN (0)

## 2019-04-24 ASSESSMENT — MIFFLIN-ST. JEOR: SCORE: 1881.51

## 2019-04-24 NOTE — NURSING NOTE
"/69   Pulse 65   Temp 98.1  F (36.7  C) (Oral)   Ht 1.803 m (5' 11\")   Wt 94.9 kg (209 lb 4.8 oz)   BMI 29.19 kg/m    Chief Complaint   Patient presents with     RECHECK     follow up with annie FERGUSONimpaxton cma       "
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
## 2019-04-25 LAB
CD3 CELLS # BLD: 1358 CELLS/UL (ref 603–2990)
CD3 CELLS NFR BLD: 83 % (ref 49–84)
CD3+CD4+ CELLS # BLD: 706 CELLS/UL (ref 441–2156)
CD3+CD4+ CELLS NFR BLD: 43 % (ref 28–63)
CD3+CD4+ CELLS/CD3+CD8+ CLL BLD: 1.02 % (ref 1.4–2.6)
CD3+CD8+ CELLS # BLD: 690 CELLS/UL (ref 125–1312)
CD3+CD8+ CELLS NFR BLD: 42 % (ref 10–40)
IFC SPECIMEN: ABNORMAL
RPR SER QL: NONREACTIVE
T PALLIDUM AB SER QL: REACTIVE

## 2019-04-28 LAB
RPR SER QL: NONREACTIVE
T PALLIDUM AB SER QL AGGL: REACTIVE

## 2019-04-30 NOTE — PROGRESS NOTES
[facility] Staff Note: Mr. Branch was seen, examined, and the case was discussed at length with Dr. Luna, Medicine HIV Resident -- I agree with his interval history and examination, assessment and plan in this outpatient ID / HIV Progress note. The note reflects my observations and opinions, and the plan outlined fully reflects my approach. I have reviewed the available history, laboratory results, and radiography and other reports today with Dr. Luna.  (I also provided a prescription for Truvada PrEP for Mr. Branch's seronegative lactating wife who accompanied him to clinic today.)

## 2019-05-16 DIAGNOSIS — K21.00 GASTROESOPHAGEAL REFLUX DISEASE WITH ESOPHAGITIS: ICD-10-CM

## 2019-05-16 RX ORDER — OMEPRAZOLE 40 MG/1
40 CAPSULE, DELAYED RELEASE ORAL AT BEDTIME
Qty: 90 CAPSULE | Refills: 1 | Status: SHIPPED | OUTPATIENT
Start: 2019-05-16 | End: 2019-11-25

## 2019-05-20 ENCOUNTER — TELEPHONE (OUTPATIENT)
Dept: INFECTIOUS DISEASES | Facility: CLINIC | Age: 39
End: 2019-05-20

## 2019-11-18 ENCOUNTER — TRANSFERRED RECORDS (OUTPATIENT)
Dept: HEALTH INFORMATION MANAGEMENT | Facility: CLINIC | Age: 39
End: 2019-11-18

## 2019-11-25 DIAGNOSIS — K21.00 GASTROESOPHAGEAL REFLUX DISEASE WITH ESOPHAGITIS: ICD-10-CM

## 2019-11-25 RX ORDER — OMEPRAZOLE 40 MG/1
40 CAPSULE, DELAYED RELEASE ORAL AT BEDTIME
Qty: 90 CAPSULE | Refills: 1 | Status: SHIPPED | OUTPATIENT
Start: 2019-11-25 | End: 2020-06-01

## 2019-11-25 NOTE — TELEPHONE ENCOUNTER
Per [facility] Protocol, ok to refill medication. New prescription ordered.    Krysta Pitts RN

## 2019-11-29 ENCOUNTER — HOSPITAL ENCOUNTER (OUTPATIENT)
Dept: CT IMAGING | Facility: CLINIC | Age: 39
Discharge: HOME | End: 2019-11-29
Attending: PSYCHIATRY & NEUROLOGY | Admitting: PSYCHIATRY & NEUROLOGY
Payer: MEDICAID

## 2019-11-29 DIAGNOSIS — R45.86 LABILE MOOD: ICD-10-CM

## 2019-11-29 DIAGNOSIS — F41.9 ANXIETY: ICD-10-CM

## 2019-11-29 PROCEDURE — 70450 CT HEAD/BRAIN W/O DYE: CPT

## 2019-12-03 DIAGNOSIS — R19.7 DIARRHEA: ICD-10-CM

## 2019-12-03 RX ORDER — LOPERAMIDE HYDROCHLORIDE 2 MG/1
TABLET ORAL
Qty: 30 TABLET | Refills: 6 | Status: SHIPPED | OUTPATIENT
Start: 2019-12-03 | End: 2021-05-06

## 2019-12-03 NOTE — TELEPHONE ENCOUNTER
Per message below from Dr. Ramirez, OK to refill loperamide with 6 additional refills. New prescription ordered.    Krysta Pitts RN  --------------------------------------------------------  Yury Ramirez MD Beinlich, Abby, RN; P Clinic Coordinators-Med-Spec-             Krysta,   Yes, please give him six refills.     Ivory,   Would you please be willing to contact the patient at your convenience and schedule him for a routine follow-up appointment with me sometime during the first couple of months of 2020, if possible.     Thanks much,   Yury    Previous Messages      ----- Message -----   From: Krysta Pitts RN   Sent: 11/25/2019   8:22 AM CST   To: MD Dr. Ashley Doe,          We received a medication refill request for this patient's loperamide. You last saw them on 04/24/2019 and they do not have a next scheduled appointment with you. You last refilled the medication on 03/15/2019 for 6 months worth of medication. Did you want to send in a new order for the medication? If so, how many refills?       Thanks,   Krysta Pitts RN

## 2020-01-08 DIAGNOSIS — Z21 HUMAN IMMUNODEFICIENCY VIRUS I INFECTION (H): ICD-10-CM

## 2020-01-08 NOTE — TELEPHONE ENCOUNTER
Per [facility] Ambulatory Care Protocol, ok to refill medication. New prescription ordered and patient scheduled for follow up appointment with Dr. Ramirez on 02/05/2020.    Krysta Pitts RN

## 2020-02-04 ENCOUNTER — TELEPHONE (OUTPATIENT)
Dept: INFECTIOUS DISEASES | Facility: CLINIC | Age: 40
End: 2020-02-04

## 2020-02-13 ENCOUNTER — TELEPHONE (OUTPATIENT)
Dept: PHARMACY | Facility: CLINIC | Age: 40
End: 2020-02-13

## 2020-02-13 NOTE — TELEPHONE ENCOUNTER
Called patient for refill reminder.    Will  prescriptions address has been verified.     1 month of on time refill.    Last Filled on:  01/08/20   Follow-up Date: 03/05/20    Julisa Lucas CPhT  [facility]  [phone]

## 2020-03-05 ENCOUNTER — TELEPHONE (OUTPATIENT)
Dept: PHARMACY | Facility: CLINIC | Age: 40
End: 2020-03-05

## 2020-03-05 NOTE — TELEPHONE ENCOUNTER
Called patient for refill reminder.  He didn't know how much he had. He was at work. I asked that he call me back.     Last Filled on: 02/13/20   Follow-up Date: 03/09/20    Julisa Lucas CPhT  [facility]  [phone]

## 2020-03-10 NOTE — TELEPHONE ENCOUNTER
Called patient for refill reminder.    Will mail prescriptions address has been verified.     1 month of on time refill.    Last Filled on: 02/12/20   Follow-up Date: 04/06/20    Julisa Lucas CPhT  [facility]  [phone]

## 2020-03-25 ENCOUNTER — VIRTUAL VISIT (OUTPATIENT)
Dept: INFECTIOUS DISEASES | Facility: CLINIC | Age: 40
End: 2020-03-25
Attending: INTERNAL MEDICINE
Payer: MEDICAID

## 2020-03-25 DIAGNOSIS — M25.561 CHRONIC PAIN OF BOTH KNEES: ICD-10-CM

## 2020-03-25 DIAGNOSIS — M25.562 CHRONIC PAIN OF BOTH KNEES: ICD-10-CM

## 2020-03-25 DIAGNOSIS — G89.29 CHRONIC PAIN OF BOTH KNEES: ICD-10-CM

## 2020-03-25 DIAGNOSIS — M79.10 MYALGIA: ICD-10-CM

## 2020-03-25 DIAGNOSIS — Z21 HUMAN IMMUNODEFICIENCY VIRUS I INFECTION (H): Primary | ICD-10-CM

## 2020-03-31 ENCOUNTER — TELEPHONE (OUTPATIENT)
Dept: PHARMACY | Facility: CLINIC | Age: 40
End: 2020-03-31

## 2020-03-31 NOTE — TELEPHONE ENCOUNTER
Attempted to contact the patient to for refill reminder call,  left message on voicemail        Last filled on: 03/11/2020    Follow-up Date: 04/06/2020    Julius Campbell  -----------------------------------------------   [email or web address]  Pharmacy Technician  [facility]: [phone]

## 2020-04-06 DIAGNOSIS — Z21 HUMAN IMMUNODEFICIENCY VIRUS I INFECTION (H): Primary | ICD-10-CM
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
## 2020-04-06 NOTE — TELEPHONE ENCOUNTER
Called patient for refill reminder.    Will mail Descovy and Tivicay.  prescriptions address has been verified.     2 month of on time refill.    Last Filled on: 03/11/20   Follow-up Date: 04/30/20    Julisa Lucas CPhT  [facility]  [phone]

## 2020-04-30 ENCOUNTER — TELEPHONE (OUTPATIENT)
Dept: PHARMACY | Facility: CLINIC | Age: 40
End: 2020-04-30

## 2020-04-30 NOTE — TELEPHONE ENCOUNTER
Called patient for refill reminder.    Will mail 2 prescriptions address has been verified.     Amaya Doss    4 month of on time refill.    Last Filled on:04/08/2020   Follow-up Date: 05/25/2020    Julius Campbell  -----------------------------------------------   [email or web address]  Pharmacy Technician  [facility]: [phone]

## 2020-05-27 ENCOUNTER — TELEPHONE (OUTPATIENT)
Dept: PHARMACY | Facility: CLINIC | Age: 40
End: 2020-05-27

## 2020-05-27 NOTE — TELEPHONE ENCOUNTER
Attempted to contact the patient to for refill reminder call,  left message on voicemail    Last filled on: 04/30/2020    Follow-up Date: 06/02/2020    Julius Campbell  -----------------------------------------------   [email or web address]  Pharmacy Technician  [facility]: [phone]

## 2020-05-31 DIAGNOSIS — K21.00 GASTROESOPHAGEAL REFLUX DISEASE WITH ESOPHAGITIS: ICD-10-CM

## 2020-06-01 DIAGNOSIS — K21.00 GASTROESOPHAGEAL REFLUX DISEASE WITH ESOPHAGITIS: ICD-10-CM

## 2020-06-01 RX ORDER — OMEPRAZOLE 40 MG/1
CAPSULE, DELAYED RELEASE ORAL
Qty: 90 CAPSULE | OUTPATIENT
Start: 2020-06-01

## 2020-06-01 RX ORDER — OMEPRAZOLE 40 MG/1
CAPSULE, DELAYED RELEASE ORAL
Qty: 30 CAPSULE | Refills: 0 | Status: SHIPPED | OUTPATIENT
Start: 2020-06-01 | End: 2020-07-08

## 2020-06-01 NOTE — TELEPHONE ENCOUNTER
Per Dr. Rmairez's documentation on 03/25/2020, patient should continue omeprazole for now and determine if heart burn improves after the switch of antiretroviral therapy. Ordered one additional month of omeprazole.    Krysta Pitts RN

## 2020-06-02 ENCOUNTER — ALLIED HEALTH/NURSE VISIT (OUTPATIENT)
Dept: PHARMACY | Facility: CLINIC | Age: 40
End: 2020-06-02
Payer: MEDICAID

## 2020-06-02 DIAGNOSIS — Z21 HUMAN IMMUNODEFICIENCY VIRUS I INFECTION (H): Primary | ICD-10-CM

## 2020-06-02 PROCEDURE — 99207 ZZC NO CHARGE LOS: CPT | Performed by: PHARMACIST

## 2020-06-02 NOTE — PROGRESS NOTES
Clinical Pharmacy Consult:                                                    Reynaldo Branch is a 39 year old male called for a clinical pharmacist consult.  He was referred to me from Dr. Ramirez.     Reason for Consult: Medication adherence ck.    Discussion: Reynaldo reports he started taking Tivicay and Descovy about 1 month ago.  Reports he takes both at the same time once per day, and has not missed any doses.  Reports when he first started these medications he was getting very bad headaches.  Reports his headaches have subsided and only happen rarely now.  Reports since switching to this medication diarrhea symptoms have resolved and he has not had to use any loperamide.  Reports he has a supply of medication, but has not called our pharmacy to send more yet.  Reports the pharmacy has left him a message about refills.  Reports he will call today.  Reports he is working with Terrance to get his insurance reinstated so he can do labs.    Plan:  1.  Encouraged continued medication adherence.  Talked about labs that are not due.  Per patient account he is working with Terrance around insurance.  2.  Suggested patient can take Tylenol for headaches, not exceeding more than 3000 mg/day.  3.  Reviewed labs from 4/2019.    Patient is asked to call with any questions concerns.    Didi Maxwell, [facility] Pharmacist.   [phone]

## 2020-07-07 ENCOUNTER — TELEPHONE (OUTPATIENT)
Dept: PHARMACY | Facility: CLINIC | Age: 40
End: 2020-07-07

## 2020-07-07 DIAGNOSIS — Z21 HUMAN IMMUNODEFICIENCY VIRUS I INFECTION (H): ICD-10-CM

## 2020-07-07 DIAGNOSIS — K21.00 GASTROESOPHAGEAL REFLUX DISEASE WITH ESOPHAGITIS: ICD-10-CM

## 2020-07-07 NOTE — TELEPHONE ENCOUNTER
Attempted to contact the patient to for refill reminder call,  left message on voicemail    Last filled on: 06/15/20    Follow-up Date: 07/13/20 2nd attempt    Julisa Lucas CPhT  [facility]  [phone]

## 2020-07-08 RX ORDER — EMTRICITABINE AND TENOFOVIR ALAFENAMIDE 200; 25 MG/1; MG/1
TABLET ORAL
Qty: 90 TABLET | Refills: 2 | Status: SHIPPED | OUTPATIENT
Start: 2020-07-08 | End: 2021-01-07

## 2020-07-08 RX ORDER — OMEPRAZOLE 40 MG/1
CAPSULE, DELAYED RELEASE ORAL
Qty: 30 CAPSULE | Refills: 5 | Status: SHIPPED | OUTPATIENT
Start: 2020-07-08 | End: 2021-01-07

## 2020-07-10 ENCOUNTER — TELEPHONE (OUTPATIENT)
Dept: INFECTIOUS DISEASES | Facility: CLINIC | Age: 40
End: 2020-07-10

## 2020-08-07 ENCOUNTER — TELEPHONE (OUTPATIENT)
Dept: PHARMACY | Facility: CLINIC | Age: 40
End: 2020-08-07

## 2020-09-09 ENCOUNTER — TELEPHONE (OUTPATIENT)
Dept: PHARMACY | Facility: CLINIC | Age: 40
End: 2020-09-09

## 2020-09-09 NOTE — TELEPHONE ENCOUNTER
Attempted to contact the patient to for refill reminder call,  left message on voicemail    Last filled on: 08/07/20    Follow-up Date: 09/15/20 2nd attempt    Julisa Lucas CPhT  [facility]  [phone]

## 2020-09-15 ENCOUNTER — TELEPHONE (OUTPATIENT)
Dept: PHARMACY | Facility: CLINIC | Age: 40
End: 2020-09-15

## 2020-09-15 NOTE — TELEPHONE ENCOUNTER
Called patient for refill reminder.    Will mail prescriptions address has been verified.     Amaya  Omeprazole  Descovy    9 month of on time refill.    Last Filled on:09/10/2020   Follow-up Date: 10/05/2020    Julius Campbell  -----------------------------------------------   [email or web address]  Pharmacy Technician  [facility]: [phone]

## 2020-10-05 ENCOUNTER — TELEPHONE (OUTPATIENT)
Dept: PHARMACY | Facility: CLINIC | Age: 40
End: 2020-10-05

## 2020-10-05 NOTE — TELEPHONE ENCOUNTER
Called patient for refill reminder.    Will mail prescriptions address has been verified.       Last Filled on:   09/10/20  Follow-up Date: 11/05/20    Julisa Lucas CPhT  [facility]  [phone]

## 2020-11-04 ENCOUNTER — TELEPHONE (OUTPATIENT)
Dept: PHARMACY | Facility: CLINIC | Age: 40
End: 2020-11-04

## 2020-11-04 NOTE — TELEPHONE ENCOUNTER
Attempted to contact the patient to for refill reminder call,  left message on voicemail    Last filled on: 10/05/20    Follow-up Date: 11/10/20 2nd attempt    Julisa Lucas CPhT  [facility]  [phone]

## 2020-11-12 NOTE — TELEPHONE ENCOUNTER
Called patient for refill reminder.    Will mail prescriptions address has been verified.       Last Filled on: 10/05/20   Follow-up Date: 12/04/20    Julisa Lucas CPhT  [facility]  [phone]

## 2020-12-07 ENCOUNTER — TELEPHONE (OUTPATIENT)
Dept: PHARMACY | Facility: CLINIC | Age: 40
End: 2020-12-07

## 2020-12-07 NOTE — TELEPHONE ENCOUNTER
Called patient for refill reminder.    Will mail prescriptions address has been verified.       Last Filled on: 11/12/20  Follow-up Date: 01/07/21    Julisa Lucas CPhT  [facility]  [phone]

## 2021-01-07 ENCOUNTER — TELEPHONE (OUTPATIENT)
Dept: PHARMACY | Facility: CLINIC | Age: 41
End: 2021-01-07

## 2021-01-07 DIAGNOSIS — Z21 HUMAN IMMUNODEFICIENCY VIRUS I INFECTION (H): ICD-10-CM

## 2021-01-07 DIAGNOSIS — K21.00 GASTROESOPHAGEAL REFLUX DISEASE WITH ESOPHAGITIS: ICD-10-CM

## 2021-01-07 RX ORDER — OMEPRAZOLE 40 MG/1
CAPSULE, DELAYED RELEASE ORAL
Qty: 30 CAPSULE | Refills: 1 | Status: SHIPPED | OUTPATIENT
Start: 2021-01-07 | End: 2021-04-05

## 2021-01-07 RX ORDER — EMTRICITABINE AND TENOFOVIR ALAFENAMIDE 200; 25 MG/1; MG/1
1 TABLET ORAL DAILY
Qty: 30 TABLET | Refills: 1 | Status: SHIPPED | OUTPATIENT
Start: 2021-01-07 | End: 2021-04-05

## 2021-01-07 NOTE — TELEPHONE ENCOUNTER
Called patient for refill reminder.    Will mail prescriptions address has been verified.       Last Filled on: 12/07/20   Follow-up Date: 02/05/21    Julisa Lucas CPhT  [facility]  [phone]

## 2021-02-08 ENCOUNTER — TELEPHONE (OUTPATIENT)
Dept: PHARMACY | Facility: CLINIC | Age: 41
End: 2021-02-08

## 2021-02-08 NOTE — TELEPHONE ENCOUNTER
Attempted to contact the patient to for refill reminder call,  left message on voicemail    Last filled on: 01/14/21    Follow-up Date: 02/12/21    Julisa Lucas CPhT  [facility]  [phone]

## 2021-03-08 ENCOUNTER — TELEPHONE (OUTPATIENT)
Dept: PHARMACY | Facility: CLINIC | Age: 41
End: 2021-03-08

## 2021-03-08 NOTE — TELEPHONE ENCOUNTER
Called patient for refill reminder.    Will mail prescriptions address has been verified.         Last Filled on:02/15/2021   Follow-up Date: 04/02/2021    Julius Campbell  -----------------------------------------------   [email or web address]  Pharmacy Technician  [facility]: [phone]

## 2021-04-05 ENCOUNTER — TELEPHONE (OUTPATIENT)
Dept: PHARMACY | Facility: CLINIC | Age: 41
End: 2021-04-05

## 2021-04-05 DIAGNOSIS — K21.00 GASTROESOPHAGEAL REFLUX DISEASE WITH ESOPHAGITIS: ICD-10-CM

## 2021-04-05 DIAGNOSIS — Z21 HUMAN IMMUNODEFICIENCY VIRUS I INFECTION (H): ICD-10-CM

## 2021-04-05 RX ORDER — EMTRICITABINE AND TENOFOVIR ALAFENAMIDE 200; 25 MG/1; MG/1
1 TABLET ORAL DAILY
Qty: 30 TABLET | Refills: 0 | Status: SHIPPED | OUTPATIENT
Start: 2021-04-05 | End: 2021-05-03

## 2021-04-05 RX ORDER — OMEPRAZOLE 40 MG/1
CAPSULE, DELAYED RELEASE ORAL
Qty: 30 CAPSULE | Refills: 0 | Status: SHIPPED | OUTPATIENT
Start: 2021-04-05 | End: 2021-05-03

## 2021-04-05 NOTE — TELEPHONE ENCOUNTER
Called patient for refill reminder.    Will mail prescriptions address has been verified.   30 day supply         Last Filled on: 03/10/21   Follow-up Date: 05/05/21    Julisa Lucas CPhT  [facility]  [phone]

## 2021-05-03 DIAGNOSIS — Z21 HUMAN IMMUNODEFICIENCY VIRUS I INFECTION (H): ICD-10-CM

## 2021-05-03 DIAGNOSIS — K21.00 GASTROESOPHAGEAL REFLUX DISEASE WITH ESOPHAGITIS: ICD-10-CM

## 2021-05-03 RX ORDER — OMEPRAZOLE 40 MG/1
CAPSULE, DELAYED RELEASE ORAL
Qty: 30 CAPSULE | Refills: 0 | Status: SHIPPED | OUTPATIENT
Start: 2021-05-03 | End: 2021-06-03

## 2021-05-03 RX ORDER — EMTRICITABINE AND TENOFOVIR ALAFENAMIDE 200; 25 MG/1; MG/1
1 TABLET ORAL DAILY
Qty: 30 TABLET | Refills: 0 | Status: SHIPPED | OUTPATIENT
Start: 2021-05-03 | End: 2021-05-12

## 2021-05-04 ENCOUNTER — TELEPHONE (OUTPATIENT)
Dept: PHARMACY | Facility: CLINIC | Age: 41
End: 2021-05-04

## 2021-05-04 NOTE — TELEPHONE ENCOUNTER
Setting call date for future fills.    Last filled: 05/04/21    Next Call Date: 06/03/21    Julisa Lucas CPhT  [facility]  [phone]

## 2021-05-06 ENCOUNTER — VIRTUAL VISIT (OUTPATIENT)
Dept: PHARMACY | Facility: CLINIC | Age: 41
End: 2021-05-06
Payer: MEDICAID

## 2021-05-06 DIAGNOSIS — G47.9 TROUBLE IN SLEEPING: ICD-10-CM

## 2021-05-06 DIAGNOSIS — Z21 HUMAN IMMUNODEFICIENCY VIRUS I INFECTION (H): Primary | ICD-10-CM

## 2021-05-06 DIAGNOSIS — F31.9 BIPOLAR DISORDER (H): ICD-10-CM

## 2021-05-06 DIAGNOSIS — M25.50 ARTHRALGIA, UNSPECIFIED JOINT: ICD-10-CM

## 2021-05-06 DIAGNOSIS — K21.9 GASTROESOPHAGEAL REFLUX DISEASE WITHOUT ESOPHAGITIS: ICD-10-CM

## 2021-05-06 PROCEDURE — 99607 MTMS BY PHARM ADDL 15 MIN: CPT | Performed by: PHARMACIST

## 2021-05-06 PROCEDURE — 99605 MTMS BY PHARM NP 15 MIN: CPT | Performed by: PHARMACIST

## 2021-05-06 RX ORDER — TRAZODONE HYDROCHLORIDE 50 MG/1
1-3 TABLET, FILM COATED ORAL
COMMUNITY
Start: 2021-05-03 | End: 2021-06-07

## 2021-05-06 RX ORDER — BUSPIRONE HYDROCHLORIDE 15 MG/1
1 TABLET ORAL 2 TIMES DAILY
COMMUNITY
Start: 2021-04-05 | End: 2021-06-07

## 2021-05-06 RX ORDER — DIVALPROEX SODIUM 250 MG/1
1 TABLET, DELAYED RELEASE ORAL 2 TIMES DAILY
COMMUNITY
Start: 2021-04-05 | End: 2021-05-12

## 2021-05-06 NOTE — PROGRESS NOTES
Medication Therapy Management (MTM) Encounter    ASSESSMENT/PLAN:                            Medication Adherence/Access: See below for considerations    HIV: Stable with excellent medication adherence.  He is due for lab/clinic follow-up, unfortunately, due to coverage issues these are not able to be completed at this time.  When his labs were last checked on 4/24/2019 he had a CD4 of 706 and ongoing undetectable viral load.  He connected with our  DREAD Dudley after our call and Terrance will see if he can assist him with getting coverage through [facility].    Bipolar/Depression/Anxiety: Currently well controlled and feels most stable he has been in a very long time with no verónica or depression.  However, again concerns that he will not be able to continue his medications as he is not able to see a provider regularly.    Trouble sleeping: Has been using slightly more trazodone for a a while now than he historically used to use.  He is taking his dolutegravir at night and this can contribute to insomnia (incidence 1-7%), I asked him to try taking this medication in the morning.    GERD: Currently stable on omeprazole 40 mg daily. In the future could consider a switch to H2 blocker.    Pain: Experiencing some joint pain and wonders about gabapentin use which he has previously been on.  Reviewed that this would need to be discussed with a provider during a visit, he states understanding.       Follow-up: 4 to 6 weeks    Clara Lee, ElaineD, BCACP    SUBJECTIVE/OBJECTIVE:                          Reynaldo Branch is a 40 year old male called for an initial visit. He was referred to me from ID patient list/Dr. Ramirez.      Reason for visit: Medication Check-in.    Allergies/ADRs: Reviewed in chart  Tobacco: He reports that he has quit smoking. His smoking use included cigarettes. He smoked 0.50 packs per day. He has never used smokeless tobacco.  Alcohol: none7  Illicit Drug Use: none    Patient  was last seen in the [facility] HIV clinic by Dr. Ramirez on 3/25/2020.  Unfortunately he has struggled to come in for a follow-up visit and lab work because this is not currently covered by his insurance.  Discussed with pharmacy and DREAD Dudley --appears that at this time he currently has Part D coverage and Program  (which covers MTM visits).    He received a letter in the mail about Covid research trial (I believe he is referring to the vaccine trial) and wonders if he is able to get his lab work completed through the trial if he should opt to do this.  Note that he has not had his Covid vaccination yet.    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  He reports that he has done an excellent job following safety protocols and that he has not had Covid infection.    Patient denies fever, sore throat, chills, stomach upset, nausea, vomiting, constipation, diarrhea.    Medication Adherence/Access:   Patient takes medications directly from bottles.  Patient takes medications 2 time(s) per day.   Per patient, misses medication 0 times per week.   Medication barriers: obtaining medication from insurance.   The patient fills medications at [place]: YES - [facility] mails monthly     HIV - dx 5/2009  Current medications:   Tivicay - 1 tablet daily, PM  Descovy 200/25 - 1 tablet daily, PM    Past medications:   Darunavir/ritonavir + Descovy -switched 3/25/2020 due to possible diarrhea    (Truvada was begun 5/09 but major drug holidays since then, including off treatment ~ 4/10 to mid-9/12, again late 1/13 to 9/9/13, and 4/17 until about 6/15/17, switched to Descovy on 9/27/17).      Mutations (reviewed 5/6/2021):   PI: E35D, L63T, A71V (sensitive to all PIs)  HLA B 5701: negative    During his last visit with Ashley Hobbs he was switched from his old regimen of darunavir/ritonavir + emtricitabine/tenofovir  alafenamide due to possibly worsening diarrhea.  He reports that he tolerates the new regimen very well without complaint.    Bipolar/Depression/Anxiety  Divalproex  mg -1 tablet twice daily  Buspirone 15 mg -1 tablet twice daily    His mood is very stable right now, in fact the most stable that it has been in a very long time.  He was able to get 3 months of virtual behavioral health visits through the [facility], but it sounds like this has now ended.  He is worried about being able to continue his current medications and having them prescribed.    Trouble sleeping  Trazodone 50 mg -2 to 4 tablets nightly as needed for sleep    Reports he has always had trouble with sleep, he is not sure if this is gotten worse with his new HIV medications or not    GERD  Omeprazole 40 mg - 1 tablet daily, PM    If he does not take this medication he gets a terrible stabbing pain in his chest.  He has been on this medication for a couple years and finds it is effective.  He has tried stopping, however unclear if he switch to an alternative medication.  He wonders if there is more than just acid reflux going on because of the severity of his symptoms when he stops the medication.    Pain  Gabapentin - not taking    He does have some joint pain and would not mind being on gabapentin again.    Today's Vitals: There were no vitals taken for this visit.     BP Readings from Last 3 Encounters:   04/24/19 132/69   11/28/18 118/56   02/05/18 118/75      Recent Labs   Lab Test 11/20/13  1535   CHOL 182   HDL 30*   LDL 84   TRIG 340*   CHOLHDLRATIO 6.2*     No results found for: A1C   Sodium   Date Value Ref Range Status   04/24/2019 139 133 - 144 mmol/L Final     Potassium   Date Value Ref Range Status   04/24/2019 4.1 3.4 - 5.3 mmol/L Final     Chloride   Date Value Ref Range Status   04/24/2019 109 94 - 109 mmol/L Final     Carbon Dioxide   Date Value Ref Range Status   04/24/2019 25 20 - 32 mmol/L Final     Anion Gap   Date Value Ref  Range Status   04/24/2019 5 3 - 14 mmol/L Final     Glucose   Date Value Ref Range Status   04/24/2019 90 70 - 99 mg/dL Final     Urea Nitrogen   Date Value Ref Range Status   04/24/2019 17 7 - 30 mg/dL Final     Creatinine   Date Value Ref Range Status   04/24/2019 1.44 (H) 0.66 - 1.25 mg/dL Final     GFR Estimate   Date Value Ref Range Status   04/24/2019 60 (L) >60 mL/min/[1.73_m2] Final     Comment:     Non  GFR Calc  Starting 12/18/2018, serum creatinine based estimated GFR (eGFR) will be   calculated using the Chronic Kidney Disease Epidemiology Collaboration   (CKD-EPI) equation.       Calcium   Date Value Ref Range Status   04/24/2019 8.9 8.5 - 10.1 mg/dL Final     CrCl (CG - no adjustment): 89 ml/min    Lab Results   Component Value Date    HIV-1 RNA Quant Result HIV-1 RNA Not Detected 04/24/2019    HIV RNA QT Log Not Calculated 04/24/2019     HIV Latest Ref Rng & Units 4/24/2019   CD3, [place] T 49 - 84 % 83   CD4, [place] T 28 - 63 % 43   CD8, Suppressor T 10 - 40 % 42 (H)   CD4/CD8 Ratio 1.40 - 2.60 1.02 (L)   Absolute CD4 441 - 2,156 cells/uL 706     Lab Results   Component Value Date    CR 1.44 04/24/2019      Recent Labs   Lab Test 04/24/19  1852   WBC 7.1   RBC 4.15*   HGB 14.3   HCT 40.4   MCV 97   MCH 34.5*   MCHC 35.4   RDW 12.3         Recent Labs   Lab Test 04/24/19  1852   PROTTOTAL 7.5   ALBUMIN 4.4   BILITOTAL 0.5   ALKPHOS 79   AST 21   ALT 20      ----------------    I spent 30 minutes with this patient today. All changes were made via collaborative practice agreement with Dr. Ramirez. A copy of the visit note was provided to the patient's referring provider.  The patient was mailed a summary of these recommendations.     Telemedicine Visit Details  Type of service:  Telephone visit  Start Time: 10:15 AM  End Time: 10:45 AM  Originating Location (patient location): Home  Distant Location (provider location):  [facility]

## 2021-05-06 NOTE — Clinical Note
MAE only -- did you want me to call him and learn more about the letter he got in them mail inviting him to be a study participant?    Clara

## 2021-05-07 NOTE — PATIENT INSTRUCTIONS
Recommendations from today's MTM visit:                                                      1) Try taking your Tivicay in the morning since this can contribute to trouble sleeping.      2) You could try enrolling in the trial if you are able to follow through with the protocols and are interested.  If you do this, please call and let myself ([phone]) or DREAD Dudley ([phone]) know.  We didn't talk too much about which trial this was (there are a couple trials going on) so would just want to check-in further regarding this.    3) Continue working with DREAD Dudley and [facility] to get your insurance updated so you can come in for labs and a visit with Dr. Ramirez.      Next MTM visit: I'll call you again in 4-6 weeks to check-in.    To schedule another MTM appointment, please call the clinic directly or you may call the MTM scheduling line at [phone] or toll-free at [phone].     I value your experience and would be very thankful for your time with providing feedback on our clinic survey. You may receive a survey via email or text message in the next few days.     Please feel free to contact me with any questions or concerns you have.      Take care!  Clara Lee, PharmD, BCACP

## 2021-05-12 ENCOUNTER — TELEPHONE (OUTPATIENT)
Dept: INFECTIOUS DISEASES | Facility: CLINIC | Age: 41
End: 2021-05-12

## 2021-05-12 DIAGNOSIS — Z21 HUMAN IMMUNODEFICIENCY VIRUS I INFECTION (H): ICD-10-CM

## 2021-05-12 DIAGNOSIS — F31.81 BIPOLAR 2 DISORDER (H): Primary | ICD-10-CM

## 2021-05-12 RX ORDER — EMTRICITABINE AND TENOFOVIR ALAFENAMIDE 200; 25 MG/1; MG/1
1 TABLET ORAL DAILY
Qty: 30 TABLET | Refills: 1 | Status: SHIPPED | OUTPATIENT
Start: 2021-05-12 | End: 2021-07-21

## 2021-05-12 RX ORDER — DIVALPROEX SODIUM 250 MG/1
250 TABLET, DELAYED RELEASE ORAL 2 TIMES DAILY
Qty: 60 TABLET | Refills: 2 | Status: SHIPPED | OUTPATIENT
Start: 2021-05-12

## 2021-05-12 NOTE — TELEPHONE ENCOUNTER
[facility] MD Telephone Note:    Mr. Branch is presently without health insurance -- he is working with Social Work to get that reinstated.  I phoned him at his request today to discuss several issues:    - I renewed his present antiretroviral prescriptions for the next couple of months to maintain his adherence while awaiting new health insurance coverage.  The medications are covered by Program .  In addition, I provided three months worth of refills for his Depakote for bipolar disorder because he can not presently get an appointment with his usual provider who prescribes that until he has insurance.    - Once he has health insurance, he will come in for overdue (none since 4/24/19) HIV-related laboratory studies to be drawn and will be scheduled for a follow-up appointment with me as soon as possible.    - He would like a Dexa scan (because of a history of antiretroviral expousre, including TDF), which we can schedule once he has insurance.    - He continues to complain of ongoing arthralgias of his bilateral hands / wrists and ankle (without overt arthritis changes) over the past two years, along with chronic fatigue.  The arthralgias have not improved over the past year, but now he says they seem to be worsening over the past several months.  His fatigue is such that he has trouble working a whole day at his workplace without taking a brief nap at about 2 - 3 PM in the afternoon.  He has ongoing trouble with insomnia, although most nights can get enough sleep if he takes more than one 50 mg trazodone tablet.  He is wondering if he might have contracted (undiagnosed) Covid-19 at some point.  We discussed today that he likely needs a fairly complete work-up of these complaints, including a physical exam, joint x-rays, CBC, inflammatory markers, TSH, testosterone, and possibly rheumatological serologies.  He can not afford all this out of pocket.  We will schedule him for an appointment with me as  soon as feasible after he is enrolled in health insurance coverage.    - He received an invitation in the mail as an [facility] patient to participate in the [facility] Covid-19 antibody response study.  I told him it would be fine to participate if he wishes to do so and he should contact the listed .

## 2021-06-02 ENCOUNTER — TELEPHONE (OUTPATIENT)
Dept: PHARMACY | Facility: CLINIC | Age: 41
End: 2021-06-02

## 2021-06-02 DIAGNOSIS — K21.00 GASTROESOPHAGEAL REFLUX DISEASE WITH ESOPHAGITIS: ICD-10-CM

## 2021-06-02 NOTE — TELEPHONE ENCOUNTER
Called patient for refill reminder.    Will mail 6 prescriptions address has been verified.   30 day supply           Last Filled on:05/03/2021   Follow-up Date: 07/02/2021    Thank You,  Yariel Hollins, Student Pharmacist  [facility]

## 2021-06-03 RX ORDER — OMEPRAZOLE 40 MG/1
CAPSULE, DELAYED RELEASE ORAL
Qty: 30 CAPSULE | Refills: 0 | Status: SHIPPED | OUTPATIENT
Start: 2021-06-03 | End: 2021-06-23

## 2021-06-07 DIAGNOSIS — F31.81 BIPOLAR 2 DISORDER (H): Primary | ICD-10-CM

## 2021-06-07 DIAGNOSIS — G47.00 INSOMNIA: ICD-10-CM

## 2021-06-07 RX ORDER — TRAZODONE HYDROCHLORIDE 50 MG/1
50-100 TABLET, FILM COATED ORAL
Qty: 60 TABLET | Refills: 0 | Status: SHIPPED | OUTPATIENT
Start: 2021-06-07 | End: 2021-09-29

## 2021-06-07 RX ORDER — BUSPIRONE HYDROCHLORIDE 15 MG/1
15 TABLET ORAL 2 TIMES DAILY
Qty: 60 TABLET | Refills: 0 | Status: SHIPPED | OUTPATIENT
Start: 2021-06-07 | End: 2022-03-16

## 2021-06-21 DIAGNOSIS — F31.81 BIPOLAR 2 DISORDER (H): ICD-10-CM

## 2021-06-21 DIAGNOSIS — K21.00 GASTROESOPHAGEAL REFLUX DISEASE WITH ESOPHAGITIS: ICD-10-CM

## 2021-06-23 ENCOUNTER — TELEPHONE (OUTPATIENT)
Dept: PHARMACY | Facility: CLINIC | Age: 41
End: 2021-06-23

## 2021-06-23 RX ORDER — BUSPIRONE HYDROCHLORIDE 15 MG/1
TABLET ORAL
Qty: 60 TABLET | Refills: 0 | OUTPATIENT
Start: 2021-06-23

## 2021-06-23 RX ORDER — OMEPRAZOLE 40 MG/1
CAPSULE, DELAYED RELEASE ORAL
Qty: 30 CAPSULE | Refills: 0 | Status: SHIPPED | OUTPATIENT
Start: 2021-06-23 | End: 2021-07-21

## 2021-06-23 NOTE — TELEPHONE ENCOUNTER
Called pt back about his travel to [place]. Pt is leaving 6/28 and he doesn't know return date.   Patient will   7 prescriptions on 06/25/21  30 day supply       Last Filled on:  06/08/21   Follow-up Date: 07/22/21    Julisa Lucas CPhT  [facility]  [phone]

## 2021-07-21 ENCOUNTER — TELEPHONE (OUTPATIENT)
Dept: PHARMACY | Facility: CLINIC | Age: 41
End: 2021-07-21

## 2021-07-21 DIAGNOSIS — Z21 HUMAN IMMUNODEFICIENCY VIRUS I INFECTION (H): ICD-10-CM

## 2021-07-21 DIAGNOSIS — K21.00 GASTROESOPHAGEAL REFLUX DISEASE WITH ESOPHAGITIS: ICD-10-CM

## 2021-07-21 RX ORDER — EMTRICITABINE AND TENOFOVIR ALAFENAMIDE 200; 25 MG/1; MG/1
1 TABLET ORAL DAILY
Qty: 30 TABLET | Refills: 0 | Status: SHIPPED | OUTPATIENT
Start: 2021-07-21 | End: 2021-09-29

## 2021-07-21 RX ORDER — OMEPRAZOLE 40 MG/1
40 CAPSULE, DELAYED RELEASE ORAL AT BEDTIME
Qty: 30 CAPSULE | Refills: 0 | Status: SHIPPED | OUTPATIENT
Start: 2021-07-21 | End: 2021-09-03

## 2021-07-21 NOTE — TELEPHONE ENCOUNTER
Called patient for refill reminder.    Will mail prescriptions address has been verified.   30 day supply       Last Filled on: 06/25/21   Follow-up Date: 08/18/21    Julisa Lucas CPhT  [facility]  [phone]

## 2021-07-30 RX ORDER — EMTRICITABINE AND TENOFOVIR ALAFENAMIDE 200; 25 MG/1; MG/1
1 TABLET ORAL DAILY
Qty: 30 TABLET | Refills: 1 | OUTPATIENT
Start: 2021-07-30

## 2021-07-30 RX ORDER — DOLUTEGRAVIR SODIUM 50 MG/1
TABLET, FILM COATED ORAL
Qty: 30 TABLET | Refills: 1 | OUTPATIENT
Start: 2021-07-30

## 2021-07-30 RX ORDER — OMEPRAZOLE 40 MG/1
CAPSULE, DELAYED RELEASE ORAL
Qty: 30 CAPSULE | Refills: 0 | OUTPATIENT
Start: 2021-07-30

## 2021-08-24 ENCOUNTER — TELEPHONE (OUTPATIENT)
Dept: INFECTIOUS DISEASES | Facility: CLINIC | Age: 41
End: 2021-08-24

## 2021-08-24 DIAGNOSIS — K21.00 GASTROESOPHAGEAL REFLUX DISEASE WITH ESOPHAGITIS: ICD-10-CM

## 2021-08-24 NOTE — TELEPHONE ENCOUNTER
Called patient for refill reminder.    Will mail 6 prescriptions; address has been verified. Trazodone was filled at a [facility] pharmacy on 8/12/2021. Refill request sent to Dr. Ramirez for omeprazole.    30 day supply           4 months of on time refill.    Last Filled on: 7/22/2021   Follow-up Date: 9/20/2021    ---  Matt Mon  Pharmacy Intern  [facility], CSC/U Discharge

## 2021-09-03 RX ORDER — OMEPRAZOLE 40 MG/1
CAPSULE, DELAYED RELEASE ORAL
Qty: 30 CAPSULE | Refills: 0 | Status: SHIPPED | OUTPATIENT
Start: 2021-09-03 | End: 2021-10-07

## 2021-09-09 ENCOUNTER — TELEPHONE (OUTPATIENT)
Dept: PHARMACY | Facility: CLINIC | Age: 41
End: 2021-09-09

## 2021-09-09 NOTE — TELEPHONE ENCOUNTER
Discussed with Terrance Issa, DREAD - would be great to see pt in clinic for follow-up MTM visit and Terrance can also make some phone calls with patient to see status of insurance.  No answer, LMOM.    Clara Lee, PharmD, BCACP  5:13 PM 09/09/21

## 2021-09-14 NOTE — TELEPHONE ENCOUNTER
Spoke with patient - he will try coming in on 9/22/2021 at 2:30 PM to see myself and DREAD Dudley.  He reports he is on call for work this whole week.    Clara Lee, PharmD, BCACP  2:11 PM 09/14/21
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
## 2021-09-29 ENCOUNTER — VIRTUAL VISIT (OUTPATIENT)
Dept: PHARMACY | Facility: CLINIC | Age: 41
End: 2021-09-29
Payer: MEDICAID

## 2021-09-29 DIAGNOSIS — G47.00 INSOMNIA: ICD-10-CM

## 2021-09-29 DIAGNOSIS — G47.9 TROUBLE IN SLEEPING: ICD-10-CM

## 2021-09-29 DIAGNOSIS — Z21 HUMAN IMMUNODEFICIENCY VIRUS I INFECTION (H): Primary | ICD-10-CM

## 2021-09-29 DIAGNOSIS — K21.00 GASTROESOPHAGEAL REFLUX DISEASE WITH ESOPHAGITIS: ICD-10-CM

## 2021-09-29 DIAGNOSIS — Z21 HUMAN IMMUNODEFICIENCY VIRUS I INFECTION (H): ICD-10-CM

## 2021-09-29 DIAGNOSIS — G62.9 NEUROPATHY: ICD-10-CM

## 2021-09-29 PROCEDURE — 99606 MTMS BY PHARM EST 15 MIN: CPT | Performed by: PHARMACIST

## 2021-09-29 PROCEDURE — 99607 MTMS BY PHARM ADDL 15 MIN: CPT | Performed by: PHARMACIST

## 2021-09-29 RX ORDER — GABAPENTIN 300 MG/1
300 CAPSULE ORAL 3 TIMES DAILY
Qty: 30 CAPSULE | Refills: 0 | Status: SHIPPED | OUTPATIENT
Start: 2021-09-29 | End: 2021-10-14

## 2021-09-29 RX ORDER — OXCARBAZEPINE 150 MG/1
1 TABLET, FILM COATED ORAL 2 TIMES DAILY
COMMUNITY
Start: 2021-08-24

## 2021-09-29 RX ORDER — TRAZODONE HYDROCHLORIDE 100 MG/1
300 TABLET ORAL AT BEDTIME
COMMUNITY
Start: 2021-09-05 | End: 2021-09-30

## 2021-09-29 RX ORDER — EMTRICITABINE AND TENOFOVIR ALAFENAMIDE 200; 25 MG/1; MG/1
1 TABLET ORAL DAILY
Qty: 30 TABLET | Refills: 2 | Status: SHIPPED | OUTPATIENT
Start: 2021-09-29 | End: 2022-01-05

## 2021-09-29 RX ORDER — TRAZODONE HYDROCHLORIDE 100 MG/1
300 TABLET ORAL
COMMUNITY
Start: 2021-09-29 | End: 2022-03-16

## 2021-09-29 NOTE — PATIENT INSTRUCTIONS
Recommendations from today's MTM visit:                                                      1) For the nerve pain start gabapentin 300 mg - 1 tablet once daily at night.    2) Ok to take the loperamide (Immodium): 2 tablets at first episode of diarrhea per day, 1 tablet after each subsequent episode that day; max 8 tablets/day.      3) TRY to take the mood-stabilizers twice daily for more consistent around-the-clock coverage.    4) Ask your psychiatrist if duloxetine could be an option for nerve pain; sometimes we avoid with a diagnosis of bipolar.      Next MTM visit: as needed.    To schedule another MTM appointment, please call the clinic directly or you may call the MTM scheduling line at [phone] or toll-free at [phone].     I value your experience and would be very thankful for your time with providing feedback on our clinic survey. You may receive a survey via email or text message in the next few days.     Please feel free to contact me with any questions or concerns you have.      Take care!  Clara Lee, PharmD, BCACP

## 2021-09-29 NOTE — Clinical Note
MAE only - I am worried about these interactions with the oxcarbazepine and his risk for developing resistance, I did order a pheno/sosa as well if we are ever able to get lab work paid for -- let me know if you think this is overkill or if you'd like to wait to see what viral load is first.    Thanks!  CYRUS
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
## 2021-10-01 RX ORDER — LOPERAMIDE HYDROCHLORIDE 2 MG/1
TABLET ORAL
Qty: 30 TABLET | Refills: 0 | Status: SHIPPED | OUTPATIENT
Start: 2021-10-01 | End: 2022-03-16

## 2021-10-04 ENCOUNTER — TELEPHONE (OUTPATIENT)
Dept: INFECTIOUS DISEASES | Facility: CLINIC | Age: 41
End: 2021-10-04

## 2021-10-04 DIAGNOSIS — R53.81 MALAISE: ICD-10-CM

## 2021-10-04 DIAGNOSIS — R09.81 CONGESTION OF NASAL SINUS: ICD-10-CM

## 2021-10-04 DIAGNOSIS — M79.10 MYALGIA: Primary | ICD-10-CM

## 2021-10-04 NOTE — TELEPHONE ENCOUNTER
Pt called clinic today reporting his son was tested positive for RSV last week, and now he is having symptoms of illness such as nasal congestion, malaise, & myalgias. Pt requesting a COVID test because he will need a negative one in order to go back to work. Order entered and apt scheduling called.   Karla Sood RN

## 2021-10-06 ENCOUNTER — LAB (OUTPATIENT)
Dept: URGENT CARE | Facility: URGENT CARE | Age: 41
End: 2021-10-06
Attending: INTERNAL MEDICINE
Payer: MEDICAID

## 2021-10-06 DIAGNOSIS — R53.81 MALAISE: ICD-10-CM

## 2021-10-06 DIAGNOSIS — M79.10 MYALGIA: ICD-10-CM

## 2021-10-06 DIAGNOSIS — R09.81 CONGESTION OF NASAL SINUS: ICD-10-CM

## 2021-10-06 PROCEDURE — U0003 INFECTIOUS AGENT DETECTION BY NUCLEIC ACID (DNA OR RNA); SEVERE ACUTE RESPIRATORY SYNDROME CORONAVIRUS 2 (SARS-COV-2) (CORONAVIRUS DISEASE [COVID-19]), AMPLIFIED PROBE TECHNIQUE, MAKING USE OF HIGH THROUGHPUT TECHNOLOGIES AS DESCRIBED BY CMS-2020-01-R: HCPCS

## 2021-10-06 PROCEDURE — U0005 INFEC AGEN DETEC AMPLI PROBE: HCPCS

## 2021-10-07 LAB — SARS-COV-2 RNA RESP QL NAA+PROBE: NEGATIVE

## 2021-10-07 RX ORDER — EMTRICITABINE AND TENOFOVIR ALAFENAMIDE 200; 25 MG/1; MG/1
TABLET ORAL
Qty: 30 TABLET | Refills: 0 | OUTPATIENT
Start: 2021-10-07

## 2021-10-07 RX ORDER — DOLUTEGRAVIR SODIUM 50 MG/1
TABLET, FILM COATED ORAL
Qty: 30 TABLET | Refills: 1 | OUTPATIENT
Start: 2021-10-07

## 2021-10-07 RX ORDER — OMEPRAZOLE 40 MG/1
CAPSULE, DELAYED RELEASE ORAL
Qty: 30 CAPSULE | Refills: 0 | Status: SHIPPED | OUTPATIENT
Start: 2021-10-07 | End: 2021-11-03

## 2021-10-11 ENCOUNTER — TELEPHONE (OUTPATIENT)
Dept: INFECTIOUS DISEASES | Facility: CLINIC | Age: 41
End: 2021-10-11

## 2021-10-11 DIAGNOSIS — Z21 HUMAN IMMUNODEFICIENCY VIRUS I INFECTION (H): Primary | ICD-10-CM

## 2021-10-11 NOTE — TELEPHONE ENCOUNTER
[facility] MD Telephone Note:    I was informed by [facility] staff that while I was out of town last week, Mr. Branch's young son was diagnosed with RSV infection and that Mr. Branch had symptoms consistent with adult RSV URI.  I tried to phone him this PM to check and see how he is doing but was unsuccessful at reaching him.  Reportedly, he is back to work today after missing work last week.  I left a voice mail message saying I would try again.  (I also need to speak to him about getting up dated HIV-related lab monitoring done and making certain he is taking care of his health insurance issues.)

## 2021-10-12 NOTE — TELEPHONE ENCOUNTER
"[facility] MD Telephone Note:    I reached Mr. Branch this early afternoon.  He reports that he feels \"much better\" versus late last week, with decreased nasal congestion and production of purulent sputum, and considerably improved but still quite present cough.  He returned to work yesterday and today and now has an acceptable amount of energy.  He lacks recent fever or other constitutional symptoms.  He is not experiencing dyspnea.  We discussed that even if he had a viral RSV pneumonia, since his condition is improving, nothing more would be done besides recuperating over time, so there is no indication at this point for checking a chest x-ray.    He presently lacks adequate health insurance to have his HIV monitoring laboratory studies drawn, but plans to participate in his company's open enrollment period next month and sign up for employer-sponsored insurance.  I encouraged him to take advantage of that, so that he can afford to have his monitoring labs done and continue to receive his antiretroviral medication.  I will place updated orders (the prior one's from last year are ) for laboratory tests to be done as soon as his new insurance commences.    He seemed satisfied with the conversation and lacked additional questions or complaints today.  "

## 2021-10-13 ENCOUNTER — VIRTUAL VISIT (OUTPATIENT)
Dept: PHARMACY | Facility: CLINIC | Age: 41
End: 2021-10-13
Payer: MEDICAID

## 2021-10-13 DIAGNOSIS — G62.9 NEUROPATHY: ICD-10-CM

## 2021-10-13 PROCEDURE — 99606 MTMS BY PHARM EST 15 MIN: CPT | Performed by: PHARMACIST

## 2021-10-13 PROCEDURE — 99607 MTMS BY PHARM ADDL 15 MIN: CPT | Performed by: PHARMACIST

## 2021-10-13 NOTE — Clinical Note
MAE only -     He doesn't think the gabapentin is helping, although I'm not sure he is actually taking consistently.  I'll check in on him again in about 1 month.    Clara

## 2021-10-13 NOTE — PROGRESS NOTES
"Medication Therapy Management (MTM) Encounter    ASSESSMENT:                            Medication Adherence/Access: No issues identified    Neuropathy: Continues to be a concern, although unclear if he is taking gabapentin consistently vs as needed and he may eventually benefit from dose increase.  He is over using ibuprofen for pain which may be contributing to his easy bruisng which we will not be able to further assess until he is able to update lab work.    PLAN:                            - Encouraged daily use of gabapentin 300 mg nightly, if still no relief consider dose increase.  - Asked pt to limit ibuprofen use and preferentially use acetaminophen - max dose of 3,000 mg daily  - He will participate in open enrollment through his company to update his insurance     Follow-up: 4 weeks - gabapentin efficacy    Clara Lee, PharmD, BCACP    SUBJECTIVE/OBJECTIVE:                          Reynaldo Branch is a 40 year old male called for a follow-up visit. He was referred to me from [facility]/.  Today's visit is a follow-up MTM visit from 9/29/2021.     Allergies/ADRs: Reviewed in chart  Past Medical History: Reviewed in chart  Tobacco: He reports that he has quit smoking. His smoking use included cigarettes. He smoked 0.50 packs per day. He has never used smokeless tobacco.  Alcohol: none    Reason for visit: check-in gabapentin efficacy.    He is doing ok - has recently recovered with what is suspected to be adult RSV - see telephone encounter dated 10/11/21.  His other concern today is that he seems to bruise easily - he reports his bruises heal but they seem to take a long time to do so.  He does take ibuprofen on an almost daily basis, \"a handful, maybe 5-7 tablets every night\"    Medication Adherence/Access: no issues reported  The patient fills medications at [facility]: YES - mailed out from Julisa Lucas [facility]Ph[facility]     Neuropathy   Gabapentin 300 mg - 1 tablet nightly, although unclear if he is taking " consistently as he does not feel this medication works.   Ibuprofen 200 mg - several tablets nightly, not able to quantify how much  Acetaminophen (dose unknown) - as needed with ibuprofen at night    He continues to try to stay off his feet and wear good shoes to help with the neuropathy - this seems to help more than anything.  Unclear if he is taking gabapentin consistently.     Today's Vitals: There were no vitals taken for this visit.     BP Readings from Last 3 Encounters:   04/24/19 132/69   11/28/18 118/56   02/05/18 118/75      Recent Labs   Lab Test 11/20/13  1535   CHOL 182   HDL 30*   LDL 84   TRIG 340*   CHOLHDLRATIO 6.2*     No results found for: A1C   Sodium   Date Value Ref Range Status   04/24/2019 139 133 - 144 mmol/L Final     Potassium   Date Value Ref Range Status   04/24/2019 4.1 3.4 - 5.3 mmol/L Final     Chloride   Date Value Ref Range Status   04/24/2019 109 94 - 109 mmol/L Final     Carbon Dioxide   Date Value Ref Range Status   04/24/2019 25 20 - 32 mmol/L Final     Anion Gap   Date Value Ref Range Status   04/24/2019 5 3 - 14 mmol/L Final     Glucose   Date Value Ref Range Status   04/24/2019 90 70 - 99 mg/dL Final     Urea Nitrogen   Date Value Ref Range Status   04/24/2019 17 7 - 30 mg/dL Final     Creatinine   Date Value Ref Range Status   04/24/2019 1.44 (H) 0.66 - 1.25 mg/dL Final     GFR Estimate   Date Value Ref Range Status   04/24/2019 60 (L) >60 mL/min/[1.73_m2] Final     Comment:     Non  GFR Calc  Starting 12/18/2018, serum creatinine based estimated GFR (eGFR) will be   calculated using the Chronic Kidney Disease Epidemiology Collaboration   (CKD-EPI) equation.       Calcium   Date Value Ref Range Status   04/24/2019 8.9 8.5 - 10.1 mg/dL Final       Lab Results   Component Value Date    HIV-1 RNA Quant Result HIV-1 RNA Not Detected 04/24/2019    HIV RNA QT Log Not Calculated 04/24/2019     HIV Latest Ref Rng & Units 4/24/2019   CD8 10 - 40 % 42 (H)   CD3T 49 -  84 % 83   CDTHTS 1.40 - 2.60 1.02 (L)   CD4 28 - 63 % 43   ABSOLUTE CD4 441 - 2,156 cells/uL 706     Lab Results   Component Value Date    CR 1.44 04/24/2019      Recent Labs   Lab Test 04/24/19  1852   WBC 7.1   RBC 4.15*   HGB 14.3   HCT 40.4   MCV 97   MCH 34.5*   MCHC 35.4   RDW 12.3         Recent Labs   Lab Test 04/24/19 1852   PROTTOTAL 7.5   ALBUMIN 4.4   BILITOTAL 0.5   ALKPHOS 79   AST 21   ALT 20      ----------------    I spent 9 minutes with this patient today. All changes were made via collaborative practice agreement with Dr. Ramirez. A copy of the visit note was provided to the patient's referring provider.  The patient was sent via [employer] a summary of these recommendations.       Telemedicine Visit Details  Type of service:  Telephone visit  Start Time: 2:22 PM  End Time: 2:31 PM  Originating Location (patient location): Home  Distant Location (provider location):  [facility] AND INFECTIOUS DISEASES MTM     Medication Therapy Recommendations  No medication therapy recommendations to display

## 2021-10-14 DIAGNOSIS — G62.9 NEUROPATHY: ICD-10-CM

## 2021-10-14 RX ORDER — GABAPENTIN 300 MG/1
300 CAPSULE ORAL 3 TIMES DAILY
Qty: 30 CAPSULE | Refills: 0 | Status: SHIPPED | OUTPATIENT
Start: 2021-10-14 | End: 2021-10-14

## 2021-10-14 RX ORDER — IBUPROFEN 200 MG
TABLET ORAL
COMMUNITY
Start: 2021-10-14 | End: 2022-03-16

## 2021-10-14 RX ORDER — GABAPENTIN 300 MG/1
300 CAPSULE ORAL AT BEDTIME
Qty: 30 CAPSULE | Refills: 0 | Status: SHIPPED | OUTPATIENT
Start: 2021-10-14 | End: 2022-02-08

## 2021-10-14 NOTE — PATIENT INSTRUCTIONS
Recommendations from today's MTM visit:                                                      1) Use gabapentin 300 mg every night, even if you don't think you need it.     2) Try to limit your ibuprofen use as this may contribute to easy bruising -- use acetaminophen (tylenol) instead.  Max dose of acetaminophen is 3,000 mg daily.    Next MTM visit: I will call you again in about 4 weeks to check-in.    To schedule another MTM appointment, please call the clinic directly or you may call the MTM scheduling line at [phone] or toll-free at [phone].     I value your experience and would be very thankful for your time with providing feedback on our clinic survey. You may receive a survey via email or text message in the next few days.     Please feel free to contact me with any questions or concerns you have.      Take care!  Clara Lee, PharmD, BCACP

## 2021-10-29 DIAGNOSIS — K21.00 GASTROESOPHAGEAL REFLUX DISEASE WITH ESOPHAGITIS: ICD-10-CM

## 2021-11-03 RX ORDER — OMEPRAZOLE 40 MG/1
CAPSULE, DELAYED RELEASE ORAL
Qty: 30 CAPSULE | Refills: 2 | Status: SHIPPED | OUTPATIENT
Start: 2021-11-03 | End: 2022-02-08

## 2021-11-11 ENCOUNTER — TELEPHONE (OUTPATIENT)
Dept: PHARMACY | Facility: CLINIC | Age: 41
End: 2021-11-11
Payer: MEDICAID

## 2021-11-11 NOTE — TELEPHONE ENCOUNTER
Called pt to check-in, no answer, left message.    Clara Lee, PharmD, BCACP  11:11 AM 11/11/21

## 2021-11-17 NOTE — TELEPHONE ENCOUNTER
Tried again, no answer.  Note that patient called 11/15 stating he needed help with medication refill.    Clara Lee, PharmD, BCACP  8:53 AM 11/17/21

## 2021-11-23 ENCOUNTER — TELEPHONE (OUTPATIENT)
Dept: PHARMACY | Facility: CLINIC | Age: 41
End: 2021-11-23
Payer: MEDICAID

## 2021-11-23 NOTE — TELEPHONE ENCOUNTER
"Patient calls to check-in and states that he is doing terrible and is at rock bottom.  Briefly shares that his wife had him arrested and spoke against in him court (?) resulting in his son being taken away from him.  He then did illicit drugs but does not further articulate on this.     Physically he feels like he has dry/crocodile skin and again complains of excessive fatigue, unable to keep his eyes open -- he was given a day off at work and reports that he slept all day.  Reports a few episodes of near-syncope.   He states that \"if something is wrong with me physically I'd like to get it fixed\".      Regarding his mental health - he continues on his medications but is not following with behavioral health.  He feels angry all the time.  Hard to follow but it sounds like he is to undergo a court-ordered psych eval.    Unfortunately he opted not to participate in open enrollment through his work as he did not think it would make sense for him since he would still have a premium and a deductible.  Open enrollment is now closed and he is not inclined to talk to his employer to try and obtain coverage after the fact.    Ultimately he wants to know how we can support him -- he is wondering if he can pay cash price for lab work and/or visit with Dr. Ramirez.    CC DREAD Dudley and Dr. Ramirez.    Clara Lee, PharmD, BCACP  3:33 PM 11/23/21      " Patient was admitted to [facility] on 2021 and discharged on 3/62/0819 for metabolic encephalopathy. Outreach made within 2 business days of discharge: Yes Top Discharge Challenges to be reviewed by the provider - patient returned to [address] spoke to daughter but could not get in touch with SHAHRIAR 
- f/u ammonia lab work - was 59 at d/c Discussed COVID-19 related testing which was available at this time. Test results were negative. Family informed of results, if available? yes Method of communication with provider : chart routing Advance Care Planning:  
Does patient have an Advance Directive:  yes; reviewed and current Inpatient Readmission Risk score: n/a Was this a readmission? no  
Patient stated reason for the admission: n/a Patients top risk factors for readmission: functional physical ability, lack of knowledge about disease and medical condition Care Transition Nurse (CTN) contacted the family by telephone to perform post hospital discharge assessment. Verified name and  with family as identifiers. Provided introduction to self, and explanation of the CTN role. Medication reconciliation was performed with family, who verbalizes understanding of administration of home medications. Advised obtaining a 90-day supply of all daily and as-needed medications. Referral to Pharm D needed: no  
 
STOP taking these medications  
   
  melatonin 3 mg tablet Comments:  
Reason for Stopping:   
     
  magnesium oxide 400 mg magnesium tab Comments:  
Reason for Stopping:   
     
  thiamine hcl 500 mg tablet Comments:  
Reason for Stopping:   
     
  folic acid (FOLVITE) 1 mg tablet Comments:  
Reason for Stopping:   
     
  B.infantis-B.ani-B.long-B.bifi (PROBIOTIC 4X) 10-15 mg TbEC Comments:  
Reason for Stopping:   
     
 
 
 
Home Health/Outpatient orders at discharge:none Durable Medical Equipment ordered at discharge:none [facility] follow up appointment(s):  
Future Appointments Date Time Provider Nabor Valdez 2/3/2021  3:00 PM Reginald Zimmerman MD [facility] follow up appointment(s): n/a 
 
01/29/21 Ctn spoke to patients daughter, a portion of the call was able to be completed however daughter had not seen her dad for a few days and requested CTN contact skilled nursing. VM left at facility and no return call. CTN to attempt to reach skilled nursing if no return call in 3-4 business days.  AR

## 2021-11-29 ENCOUNTER — DOCUMENTATION ONLY (OUTPATIENT)
Dept: INFECTIOUS DISEASES | Facility: CLINIC | Age: 41
End: 2021-11-29
Payer: MEDICAID

## 2021-11-29 NOTE — PROGRESS NOTES
Pt called this morning to discuss ongoing stress and chaos related to domestic disputes with his family around his wife and their son. Evidently it is in court and he needs a mental health evaluation to comply with the the court prior to the next hearing.In addition to the eval requested, he also expressed being distraught and sruggling to cope. I encouraged him to use the Urgent sites for behavioral health at [place]. I also strongly encouraged him to enroll in his Employer[facility] plan this month  related to their open enrollment. He stated agreement. Finally, I was able to transfer him to the legal clinic at [facility] for their assistance assigning a pro liza  to him. He has my contacy info and I encouraged him to stay in contact with me as needed.

## 2021-12-16 ENCOUNTER — TELEPHONE (OUTPATIENT)
Dept: PHARMACY | Facility: CLINIC | Age: 41
End: 2021-12-16
Payer: MEDICAID

## 2021-12-16 NOTE — TELEPHONE ENCOUNTER
Returned call to patient, he states that his trazodone was denied from his previous provider and that he is out of all of his medications.  No answer, left voicemail.    Clara Lee, PharmD, [facility]  9:01 AM 12/16/21

## 2021-12-17 NOTE — TELEPHONE ENCOUNTER
Pt returned call.    He is doing much better - moving forward with divorce, has found a new living situation, has not seen his son in over a month, is taking online classes to help with anger management.  He is feeling more optimistic about next steps.      Unfortunately he did not open enroll for insurance in November, but thinks he can open enroll again in January and will plan to do that.    Ultimately was able to get rx for trazodone - was transferred [facility] pharmacy from [facility].    He is also wondering if he can get a mental health evaluation -- he tried to set up an appointment for this however it was $800.  I asked him to touch base with [facility] to see if they have any less expensive resources -- also CC DREAD Dudley.      Clara Lee, PharmD, BCACP  1:39 PM 12/17/21

## 2021-12-21 ENCOUNTER — DOCUMENTATION ONLY (OUTPATIENT)
Dept: INFECTIOUS DISEASES | Facility: CLINIC | Age: 41
End: 2021-12-21
Payer: MEDICAID

## 2021-12-21 NOTE — PROGRESS NOTES
Update conversation with Reynaldo today, he continues to comply with court proceedings but remains frustrated because he has not confirmed his Insurance access through his employer and cannot afford the court request for a mental evaluation. I encouraged him to confirm if he still has access to the Employer Insurance and then request to extend the deadline with court until the coverage is in effect(if available, it should be January). He indicated willingness to keep me posted.

## 2022-01-05 ENCOUNTER — LAB (OUTPATIENT)
Dept: LAB | Facility: CLINIC | Age: 42
End: 2022-01-05
Attending: INTERNAL MEDICINE
Payer: COMMERCIAL

## 2022-01-05 ENCOUNTER — OFFICE VISIT (OUTPATIENT)
Dept: INFECTIOUS DISEASES | Facility: CLINIC | Age: 42
End: 2022-01-05
Attending: INTERNAL MEDICINE
Payer: COMMERCIAL

## 2022-01-05 DIAGNOSIS — R61 UNEXPLAINED NIGHT SWEATS: ICD-10-CM

## 2022-01-05 DIAGNOSIS — Z21 HUMAN IMMUNODEFICIENCY VIRUS I INFECTION (H): ICD-10-CM

## 2022-01-05 DIAGNOSIS — R79.89 ELEVATED SERUM CREATININE: ICD-10-CM

## 2022-01-05 DIAGNOSIS — M54.41 ACUTE BILATERAL LOW BACK PAIN WITH RIGHT-SIDED SCIATICA: ICD-10-CM

## 2022-01-05 DIAGNOSIS — S39.012A LUMBAR STRAIN, INITIAL ENCOUNTER: Primary | ICD-10-CM

## 2022-01-05 DIAGNOSIS — Z21 HUMAN IMMUNODEFICIENCY VIRUS I INFECTION (H): Primary | ICD-10-CM

## 2022-01-05 DIAGNOSIS — R61 NIGHT SWEATS: ICD-10-CM

## 2022-01-05 DIAGNOSIS — Z63.79 STRESSFUL LIFE EVENTS AFFECTING FAMILY AND HOUSEHOLD: ICD-10-CM

## 2022-01-05 LAB
ALBUMIN SERPL-MCNC: 3.8 G/DL (ref 3.4–5)
ALP SERPL-CCNC: 60 U/L (ref 40–150)
ALT SERPL W P-5'-P-CCNC: 27 U/L (ref 0–70)
ANION GAP SERPL CALCULATED.3IONS-SCNC: 7 MMOL/L (ref 3–14)
AST SERPL W P-5'-P-CCNC: 19 U/L (ref 0–45)
BASOPHILS # BLD AUTO: 0 10E3/UL (ref 0–0.2)
BASOPHILS NFR BLD AUTO: 1 %
BILIRUB SERPL-MCNC: 0.3 MG/DL (ref 0.2–1.3)
BUN SERPL-MCNC: 19 MG/DL (ref 7–30)
CALCIUM SERPL-MCNC: 8.4 MG/DL (ref 8.5–10.1)
CHLORIDE BLD-SCNC: 108 MMOL/L (ref 94–109)
CHOLEST SERPL-MCNC: 161 MG/DL
CO2 SERPL-SCNC: 26 MMOL/L (ref 20–32)
CREAT SERPL-MCNC: 1.49 MG/DL (ref 0.66–1.25)
CRP SERPL-MCNC: 3 MG/L (ref 0–8)
EOSINOPHIL # BLD AUTO: 0 10E3/UL (ref 0–0.7)
EOSINOPHIL NFR BLD AUTO: 1 %
ERYTHROCYTE [DISTWIDTH] IN BLOOD BY AUTOMATED COUNT: 12.7 % (ref 10–15)
ERYTHROCYTE [SEDIMENTATION RATE] IN BLOOD BY WESTERGREN METHOD: 6 MM/HR (ref 0–15)
FASTING STATUS PATIENT QL REPORTED: ABNORMAL
GFR SERPL CREATININE-BSD FRML MDRD: 59 ML/MIN/1.73M2
GLUCOSE BLD-MCNC: 86 MG/DL (ref 70–99)
HCT VFR BLD AUTO: 45.5 % (ref 40–53)
HDLC SERPL-MCNC: 39 MG/DL
HGB BLD-MCNC: 15.7 G/DL (ref 13.3–17.7)
IMM GRANULOCYTES # BLD: 0 10E3/UL
IMM GRANULOCYTES NFR BLD: 0 %
LDLC SERPL CALC-MCNC: 98 MG/DL
LIPASE SERPL-CCNC: 158 U/L (ref 73–393)
LYMPHOCYTES # BLD AUTO: 1.6 10E3/UL (ref 0.8–5.3)
LYMPHOCYTES NFR BLD AUTO: 42 %
MCH RBC QN AUTO: 33.8 PG (ref 26.5–33)
MCHC RBC AUTO-ENTMCNC: 34.5 G/DL (ref 31.5–36.5)
MCV RBC AUTO: 98 FL (ref 78–100)
MONOCYTES # BLD AUTO: 0.4 10E3/UL (ref 0–1.3)
MONOCYTES NFR BLD AUTO: 12 %
NEUTROPHILS # BLD AUTO: 1.7 10E3/UL (ref 1.6–8.3)
NEUTROPHILS NFR BLD AUTO: 44 %
NONHDLC SERPL-MCNC: 122 MG/DL
NRBC # BLD AUTO: 0 10E3/UL
NRBC BLD AUTO-RTO: 0 /100
PLATELET # BLD AUTO: 134 10E3/UL (ref 150–450)
POTASSIUM BLD-SCNC: 4.5 MMOL/L (ref 3.4–5.3)
PROT SERPL-MCNC: 7.4 G/DL (ref 6.8–8.8)
RBC # BLD AUTO: 4.64 10E6/UL (ref 4.4–5.9)
SODIUM SERPL-SCNC: 141 MMOL/L (ref 133–144)
TRIGL SERPL-MCNC: 121 MG/DL
WBC # BLD AUTO: 3.7 10E3/UL (ref 4–11)

## 2022-01-05 PROCEDURE — 85025 COMPLETE CBC W/AUTO DIFF WBC: CPT | Performed by: PATHOLOGY

## 2022-01-05 PROCEDURE — 80061 LIPID PANEL: CPT | Performed by: PATHOLOGY

## 2022-01-05 PROCEDURE — 85652 RBC SED RATE AUTOMATED: CPT | Performed by: PATHOLOGY

## 2022-01-05 PROCEDURE — 86360 T CELL ABSOLUTE COUNT/RATIO: CPT | Mod: 90 | Performed by: PATHOLOGY

## 2022-01-05 PROCEDURE — 86592 SYPHILIS TEST NON-TREP QUAL: CPT | Mod: 90 | Performed by: PATHOLOGY

## 2022-01-05 PROCEDURE — 86780 TREPONEMA PALLIDUM: CPT | Mod: 90 | Performed by: PATHOLOGY

## 2022-01-05 PROCEDURE — 87536 HIV-1 QUANT&REVRSE TRNSCRPJ: CPT | Mod: 90 | Performed by: PATHOLOGY

## 2022-01-05 PROCEDURE — 36415 COLL VENOUS BLD VENIPUNCTURE: CPT | Performed by: PATHOLOGY

## 2022-01-05 PROCEDURE — 99215 OFFICE O/P EST HI 40 MIN: CPT | Performed by: INTERNAL MEDICINE

## 2022-01-05 PROCEDURE — 86431 RHEUMATOID FACTOR QUANT: CPT | Mod: 90 | Performed by: PATHOLOGY

## 2022-01-05 PROCEDURE — 86359 T CELLS TOTAL COUNT: CPT | Mod: 90 | Performed by: PATHOLOGY

## 2022-01-05 PROCEDURE — 86038 ANTINUCLEAR ANTIBODIES: CPT | Mod: 90 | Performed by: PATHOLOGY

## 2022-01-05 PROCEDURE — 99000 SPECIMEN HANDLING OFFICE-LAB: CPT | Performed by: PATHOLOGY

## 2022-01-05 PROCEDURE — 80053 COMPREHEN METABOLIC PANEL: CPT | Performed by: PATHOLOGY

## 2022-01-05 PROCEDURE — U0005 INFEC AGEN DETEC AMPLI PROBE: HCPCS | Mod: 90 | Performed by: PATHOLOGY

## 2022-01-05 PROCEDURE — U0003 INFECTIOUS AGENT DETECTION BY NUCLEIC ACID (DNA OR RNA); SEVERE ACUTE RESPIRATORY SYNDROME CORONAVIRUS 2 (SARS-COV-2) (CORONAVIRUS DISEASE [COVID-19]), AMPLIFIED PROBE TECHNIQUE, MAKING USE OF HIGH THROUGHPUT TECHNOLOGIES AS DESCRIBED BY CMS-2020-01-R: HCPCS | Mod: 90 | Performed by: PATHOLOGY

## 2022-01-05 PROCEDURE — 86140 C-REACTIVE PROTEIN: CPT | Performed by: PATHOLOGY

## 2022-01-05 PROCEDURE — 83690 ASSAY OF LIPASE: CPT | Performed by: PATHOLOGY

## 2022-01-05 RX ORDER — EMTRICITABINE AND TENOFOVIR ALAFENAMIDE 200; 25 MG/1; MG/1
1 TABLET ORAL DAILY
Qty: 30 TABLET | Refills: 2 | Status: SHIPPED | OUTPATIENT
Start: 2022-01-05 | End: 2022-03-16

## 2022-01-05 RX ORDER — HYDROCODONE BITARTRATE AND ACETAMINOPHEN 5; 325 MG/1; MG/1
1 TABLET ORAL
Qty: 3 TABLET | Refills: 0 | Status: SHIPPED | OUTPATIENT
Start: 2022-01-05 | End: 2022-01-08

## 2022-01-05 RX ORDER — CYCLOBENZAPRINE HCL 5 MG
5 TABLET ORAL
Qty: 5 TABLET | Refills: 0 | Status: SHIPPED | OUTPATIENT
Start: 2022-01-05 | End: 2022-03-16

## 2022-01-05 NOTE — LETTER
[facility] IN[facility]IN[facility] [address] [address]  Phone: [phone]  Fax: [phone]    January 5, 2022        Reynaldo Branch  [address] [address] [facility] [id number]    To whom it may concern:    RE: Reynaldo Branch    Patient was seen and treated today at our clinic and missed work.  He will need to be excused from his workplace for the rest of the week.    Please contact me for questions or concerns.      Sincerely,        Yury Ramirez MD

## 2022-01-06 LAB
ANA SER QL IF: NEGATIVE
CD3 CELLS # BLD: 1324 CELLS/UL (ref 603–2990)
CD3 CELLS NFR BLD: 80 % (ref 49–84)
CD3+CD4+ CELLS # BLD: 682 CELLS/UL (ref 441–2156)
CD3+CD4+ CELLS NFR BLD: 41 % (ref 28–63)
CD3+CD4+ CELLS/CD3+CD8+ CLL BLD: 1.04 % (ref 1.4–2.6)
CD3+CD8+ CELLS # BLD: 658 CELLS/UL (ref 125–1312)
CD3+CD8+ CELLS NFR BLD: 40 % (ref 10–40)
RHEUMATOID FACT SER NEPH-ACNC: 9 IU/ML
SARS-COV-2 RNA RESP QL NAA+PROBE: POSITIVE
T CELL COMMENT: ABNORMAL
T PALLIDUM AB SER QL: REACTIVE

## 2022-01-06 RX ORDER — EMTRICITABINE AND TENOFOVIR ALAFENAMIDE 200; 25 MG/1; MG/1
TABLET ORAL
Qty: 30 TABLET | Refills: 2 | OUTPATIENT
Start: 2022-01-06

## 2022-01-06 RX ORDER — DOLUTEGRAVIR SODIUM 50 MG/1
TABLET, FILM COATED ORAL
Qty: 30 TABLET | Refills: 2 | OUTPATIENT
Start: 2022-01-06

## 2022-01-07 ENCOUNTER — TELEPHONE (OUTPATIENT)
Dept: INFECTIOUS DISEASES | Facility: CLINIC | Age: 42
End: 2022-01-07

## 2022-01-07 LAB
HIV1 RNA # PLAS NAA DL=20: NOT DETECTED COPIES/ML
RPR SER QL: NONREACTIVE

## 2022-01-07 NOTE — TELEPHONE ENCOUNTER
"Called pt to let him know he had a positive COVID test and he will need to isolate in his home for 10 days past his first day of symptoms. Pt verbalized understanding of this.     Pt reports he is concerned about his recent start of severe back pain, he wonders if this could be related to his positive COVID test. Pt reports he bent over to pick something up and his back just \"went out\". He reports he felt like he couldn't get back up again, and for the next 3 days when he woke up in the morning, he could not physically get himself out of bed due to severe back pain. Let pt know these would not be symptoms of COVID and he likely has a pretty bad back strain. Recommended pt try alternating heat and cold for it, take ibuprofen when needed and to try to find some gentle back stretches he could try while we let Dr Ramirez know about this and see what he recommends. Dr Ramirez updated.  Krala Sood, RN    "

## 2022-01-09 LAB — T PALLIDUM AB SER QL AGGL: REACTIVE
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
## 2022-01-16 ENCOUNTER — HEALTH MAINTENANCE LETTER (OUTPATIENT)
Age: 42
End: 2022-01-16

## 2022-02-08 DIAGNOSIS — K21.00 GASTROESOPHAGEAL REFLUX DISEASE WITH ESOPHAGITIS: ICD-10-CM

## 2022-02-08 DIAGNOSIS — G62.9 NEUROPATHY: ICD-10-CM

## 2022-02-08 RX ORDER — OMEPRAZOLE 40 MG/1
CAPSULE, DELAYED RELEASE ORAL
Qty: 30 CAPSULE | Refills: 2 | Status: SHIPPED | OUTPATIENT
Start: 2022-02-08 | End: 2022-05-13

## 2022-02-08 RX ORDER — GABAPENTIN 300 MG/1
CAPSULE ORAL
Qty: 30 CAPSULE | Refills: 0 | Status: SHIPPED | OUTPATIENT
Start: 2022-02-08 | End: 2022-03-04

## 2022-03-04 DIAGNOSIS — G62.9 NEUROPATHY: ICD-10-CM

## 2022-03-04 RX ORDER — GABAPENTIN 300 MG/1
CAPSULE ORAL
Qty: 30 CAPSULE | Refills: 1 | Status: SHIPPED | OUTPATIENT
Start: 2022-03-04 | End: 2022-03-16

## 2022-03-04 NOTE — TELEPHONE ENCOUNTER
Attempted to contact the patient to for refill reminder call,  left message on voicemail    Last filled on: 02/08/22    Follow-up Date: 03/10/22 2nd attempt    Julisa Lucas CPhT  [facility]  [phone]

## 2022-03-05 RX ORDER — GABAPENTIN 300 MG/1
CAPSULE ORAL
Qty: 30 CAPSULE | Refills: 0 | Status: CANCELLED | OUTPATIENT
Start: 2022-03-05

## 2022-03-16 ENCOUNTER — OFFICE VISIT (OUTPATIENT)
Dept: INFECTIOUS DISEASES | Facility: CLINIC | Age: 42
End: 2022-03-16
Attending: INTERNAL MEDICINE
Payer: COMMERCIAL

## 2022-03-16 VITALS
WEIGHT: 214.6 LBS | OXYGEN SATURATION: 98 % | TEMPERATURE: 98 F | BODY MASS INDEX: 30.04 KG/M2 | DIASTOLIC BLOOD PRESSURE: 77 MMHG | SYSTOLIC BLOOD PRESSURE: 127 MMHG | HEIGHT: 71 IN | HEART RATE: 60 BPM

## 2022-03-16 DIAGNOSIS — F32.9 REACTIVE DEPRESSION: ICD-10-CM

## 2022-03-16 DIAGNOSIS — R53.82 CHRONIC FATIGUE: ICD-10-CM

## 2022-03-16 DIAGNOSIS — Z86.16 HISTORY OF 2019 NOVEL CORONAVIRUS DISEASE (COVID-19): ICD-10-CM

## 2022-03-16 DIAGNOSIS — E66.3 OVERWEIGHT (BMI 25.0-29.9): ICD-10-CM

## 2022-03-16 DIAGNOSIS — F51.02 ADJUSTMENT INSOMNIA: ICD-10-CM

## 2022-03-16 DIAGNOSIS — R79.89 ELEVATED SERUM CREATININE: ICD-10-CM

## 2022-03-16 DIAGNOSIS — M54.16 LUMBAR BACK PAIN WITH RADICULOPATHY AFFECTING RIGHT LOWER EXTREMITY: ICD-10-CM

## 2022-03-16 DIAGNOSIS — Z21 HUMAN IMMUNODEFICIENCY VIRUS I INFECTION (H): Primary | ICD-10-CM

## 2022-03-16 DIAGNOSIS — G62.9 NEUROPATHY: ICD-10-CM

## 2022-03-16 PROCEDURE — G0463 HOSPITAL OUTPT CLINIC VISIT: HCPCS

## 2022-03-16 PROCEDURE — 99215 OFFICE O/P EST HI 40 MIN: CPT | Performed by: INTERNAL MEDICINE

## 2022-03-16 RX ORDER — TRAZODONE HYDROCHLORIDE 100 MG/1
300 TABLET ORAL
Qty: 90 TABLET | Refills: 5 | Status: SHIPPED | OUTPATIENT
Start: 2022-03-16

## 2022-03-16 RX ORDER — GABAPENTIN 300 MG/1
300 CAPSULE ORAL AT BEDTIME
Qty: 30 CAPSULE | Refills: 5 | Status: SHIPPED | OUTPATIENT
Start: 2022-03-16

## 2022-03-16 RX ORDER — EMTRICITABINE AND TENOFOVIR ALAFENAMIDE 200; 25 MG/1; MG/1
1 TABLET ORAL DAILY
Qty: 30 TABLET | Refills: 11 | Status: SHIPPED | OUTPATIENT
Start: 2022-03-16 | End: 2023-03-24

## 2022-03-16 ASSESSMENT — PAIN SCALES - GENERAL: PAINLEVEL: NO PAIN (0)

## 2022-03-16 NOTE — NURSING NOTE
"Chief Complaint   Patient presents with     RECHECK     b20     /77   Pulse 60   Temp 98  F (36.7  C) (Oral)   Ht 1.803 m (5' 11\")   Wt 97.3 kg (214 lb 9.6 oz)   SpO2 98%   BMI 29.93 kg/m      Kateryna Adames MA  "

## 2022-03-21 ENCOUNTER — TELEPHONE (OUTPATIENT)
Dept: INFECTIOUS DISEASES | Facility: CLINIC | Age: 42
End: 2022-03-21
Payer: COMMERCIAL
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
## 2022-04-05 ENCOUNTER — TELEPHONE (OUTPATIENT)
Dept: PHARMACY | Facility: CLINIC | Age: 42
End: 2022-04-05
Payer: COMMERCIAL

## 2022-04-05 NOTE — TELEPHONE ENCOUNTER
Attempted to contact the patient to for refill reminder call,  left message on voicemail    Last filled on: 03/04/22    Follow-up Date: 04/11/22 2nd attempt    Julisa Lucas CPhT  [facility]  [phone]

## 2022-04-11 NOTE — TELEPHONE ENCOUNTER
Called patient for refill reminder.  Pt says he will no longer be taking     Because they don't seem to work.   Will mail prescriptions address has been verified.   30 day supply         Last Filled on: 03/4/22   Follow-up Date: 05/09/22        Julisa Lucas CPhT  [facility]  [phone]

## 2022-05-11 ENCOUNTER — TELEPHONE (OUTPATIENT)
Dept: PHARMACY | Facility: CLINIC | Age: 42
End: 2022-05-11
Payer: COMMERCIAL

## 2022-05-11 DIAGNOSIS — K21.00 GASTROESOPHAGEAL REFLUX DISEASE WITH ESOPHAGITIS: ICD-10-CM

## 2022-05-11 NOTE — TELEPHONE ENCOUNTER
Called patient for refill reminder.    Will mail 3 prescriptions address has been verified.   30 day supply         Last Filled on: 04/11/22   Follow-up Date: 06/08/22    Julisa Lucas CPhT  [facility]  [phone]

## 2022-05-13 RX ORDER — OMEPRAZOLE 40 MG/1
CAPSULE, DELAYED RELEASE ORAL
Qty: 30 CAPSULE | Refills: 1 | Status: SHIPPED | OUTPATIENT
Start: 2022-05-13 | End: 2022-07-18

## 2022-06-17 ENCOUNTER — TELEPHONE (OUTPATIENT)
Dept: PHARMACY | Facility: CLINIC | Age: 42
End: 2022-06-17

## 2022-06-17 DIAGNOSIS — R19.7 DIARRHEA, UNSPECIFIED TYPE: Primary | ICD-10-CM

## 2022-06-17 RX ORDER — LOPERAMIDE HCL 2 MG
2 CAPSULE ORAL 4 TIMES DAILY PRN
COMMUNITY
End: 2022-06-17

## 2022-06-17 NOTE — TELEPHONE ENCOUNTER
Called patient for refill reminder.    Will mail 4 prescriptions address has been verified.   30 day supply       Loperamide     Last Filled on:05/11/22   Follow-up Date: 07/18/22    Julisa Lucas CPhT  [facility]  [phone]

## 2022-06-18 RX ORDER — LOPERAMIDE HCL 2 MG
2 CAPSULE ORAL 4 TIMES DAILY PRN
Qty: 120 CAPSULE | Refills: 0 | Status: SHIPPED | OUTPATIENT
Start: 2022-06-18 | End: 2022-10-04

## 2022-07-18 DIAGNOSIS — K21.00 GASTROESOPHAGEAL REFLUX DISEASE WITH ESOPHAGITIS: ICD-10-CM

## 2022-07-18 RX ORDER — OMEPRAZOLE 40 MG/1
CAPSULE, DELAYED RELEASE ORAL
Qty: 30 CAPSULE | Refills: 1 | Status: SHIPPED | OUTPATIENT
Start: 2022-07-18 | End: 2022-10-04

## 2022-08-08 ENCOUNTER — TELEPHONE (OUTPATIENT)
Dept: INFECTIOUS DISEASES | Facility: CLINIC | Age: 42
End: 2022-08-08

## 2022-08-08 NOTE — TELEPHONE ENCOUNTER
[facility]    Phone Message    May a detailed message be left on voicemail: yes     Reason for Call: Other: faustino is needing a Letter for his work stating he is HIV positive and David (provider) was supposed to get this to patient and with david being out of the office the patient is seeing if a nurse or other staff can do this for him.  Please reach out to speak with Faustino.     Action Taken: Other: ID    Travel Screening: Not Applicable

## 2022-08-09 NOTE — TELEPHONE ENCOUNTER
[facility]. He has records to take to his appointment there and doesn't not think an additional letter is needed. He will contact me again directly if that changes.

## 2022-10-04 DIAGNOSIS — K21.00 GASTROESOPHAGEAL REFLUX DISEASE WITH ESOPHAGITIS: ICD-10-CM

## 2022-10-04 DIAGNOSIS — R19.7 DIARRHEA, UNSPECIFIED TYPE: ICD-10-CM

## 2022-10-04 RX ORDER — LOPERAMIDE HCL 2 MG
CAPSULE ORAL
Qty: 120 CAPSULE | Refills: 0 | Status: SHIPPED | OUTPATIENT
Start: 2022-10-04 | End: 2022-11-02

## 2022-10-04 RX ORDER — OMEPRAZOLE 40 MG/1
CAPSULE, DELAYED RELEASE ORAL
Qty: 30 CAPSULE | Refills: 1 | Status: SHIPPED | OUTPATIENT
Start: 2022-10-04 | End: 2022-11-29

## 2022-11-01 DIAGNOSIS — R19.7 DIARRHEA, UNSPECIFIED TYPE: ICD-10-CM

## 2022-11-02 RX ORDER — LOPERAMIDE HCL 2 MG
CAPSULE ORAL
Qty: 120 CAPSULE | Refills: 0 | Status: SHIPPED | OUTPATIENT
Start: 2022-11-02 | End: 2022-11-29

## 2022-11-28 ENCOUNTER — TELEPHONE (OUTPATIENT)
Dept: INFECTIOUS DISEASES | Facility: CLINIC | Age: 42
End: 2022-11-28

## 2022-11-28 DIAGNOSIS — F19.10 POLYSUBSTANCE ABUSE (H): Primary | ICD-10-CM

## 2022-11-28 NOTE — TELEPHONE ENCOUNTER
Pt calling to update , states he needs to do labs and would like to include [employer] T[employer] screen per court request. Will request with Dr Snyder (covering for Dr Ramirez)

## 2022-11-29 ENCOUNTER — LAB (OUTPATIENT)
Dept: LAB | Facility: CLINIC | Age: 42
End: 2022-11-29

## 2022-11-29 DIAGNOSIS — F19.10 POLYSUBSTANCE ABUSE (H): ICD-10-CM

## 2022-11-29 DIAGNOSIS — R19.7 DIARRHEA, UNSPECIFIED TYPE: ICD-10-CM

## 2022-11-29 DIAGNOSIS — K21.00 GASTROESOPHAGEAL REFLUX DISEASE WITH ESOPHAGITIS: ICD-10-CM

## 2022-11-29 LAB
AMPHETAMINES UR QL SCN: ABNORMAL
BARBITURATES UR QL SCN: ABNORMAL
BENZODIAZ UR QL SCN: ABNORMAL
BZE UR QL SCN: ABNORMAL
CANNABINOIDS UR QL SCN: ABNORMAL
OPIATES UR QL SCN: ABNORMAL
PCP QUAL URINE (ROCHE): ABNORMAL

## 2022-11-29 PROCEDURE — 99000 SPECIMEN HANDLING OFFICE-LAB: CPT | Performed by: PATHOLOGY

## 2022-11-29 PROCEDURE — 80307 DRUG TEST PRSMV CHEM ANLYZR: CPT | Mod: 90 | Performed by: PATHOLOGY

## 2022-11-29 RX ORDER — LOPERAMIDE HCL 2 MG
CAPSULE ORAL
Qty: 120 CAPSULE | Refills: 0 | Status: SHIPPED | OUTPATIENT
Start: 2022-11-29 | End: 2022-12-28

## 2022-11-29 RX ORDER — OMEPRAZOLE 40 MG/1
CAPSULE, DELAYED RELEASE ORAL
Qty: 30 CAPSULE | Refills: 1 | Status: SHIPPED | OUTPATIENT
Start: 2022-11-29 | End: 2023-01-26

## 2022-11-30 DIAGNOSIS — Z21 HUMAN IMMUNODEFICIENCY VIRUS I INFECTION (H): Primary | ICD-10-CM

## 2022-11-30 DIAGNOSIS — Z11.3 ROUTINE SCREENING FOR STI (SEXUALLY TRANSMITTED INFECTION): ICD-10-CM

## 2022-12-28 DIAGNOSIS — R19.7 DIARRHEA, UNSPECIFIED TYPE: ICD-10-CM

## 2022-12-28 RX ORDER — LOPERAMIDE HCL 2 MG
CAPSULE ORAL
Qty: 120 CAPSULE | Refills: 0 | OUTPATIENT
Start: 2022-12-28

## 2022-12-28 RX ORDER — LOPERAMIDE HCL 2 MG
CAPSULE ORAL
Qty: 120 CAPSULE | Refills: 3 | Status: SHIPPED | OUTPATIENT
Start: 2022-12-28 | End: 2024-02-20

## 2023-01-08 ENCOUNTER — HEALTH MAINTENANCE LETTER (OUTPATIENT)
Age: 43
End: 2023-01-08

## 2023-01-09 ENCOUNTER — HOSPITAL ENCOUNTER (OUTPATIENT)
Dept: BEHAVIORAL HEALTH | Facility: CLINIC | Age: 43
Discharge: HOME | End: 2023-01-09
Attending: FAMILY MEDICINE | Admitting: FAMILY MEDICINE
Payer: COMMERCIAL

## 2023-01-09 VITALS — WEIGHT: 175 LBS | HEIGHT: 72 IN | BODY MASS INDEX: 23.7 KG/M2

## 2023-01-09 PROCEDURE — H0001 ALCOHOL AND/OR DRUG ASSESS: HCPCS | Mod: GT,95

## 2023-01-09 ASSESSMENT — ANXIETY QUESTIONNAIRES
GAD7 TOTAL SCORE: 15
2. NOT BEING ABLE TO STOP OR CONTROL WORRYING: NEARLY EVERY DAY
5. BEING SO RESTLESS THAT IT IS HARD TO SIT STILL: NEARLY EVERY DAY
3. WORRYING TOO MUCH ABOUT DIFFERENT THINGS: NEARLY EVERY DAY
1. FEELING NERVOUS, ANXIOUS, OR ON EDGE: NOT AT ALL
4. TROUBLE RELAXING: NEARLY EVERY DAY
GAD7 TOTAL SCORE: 15
7. FEELING AFRAID AS IF SOMETHING AWFUL MIGHT HAPPEN: NEARLY EVERY DAY
6. BECOMING EASILY ANNOYED OR IRRITABLE: NOT AT ALL

## 2023-01-09 ASSESSMENT — PAIN SCALES - GENERAL: PAINLEVEL: NO PAIN (0)

## 2023-01-09 ASSESSMENT — COLUMBIA-SUICIDE SEVERITY RATING SCALE - C-SSRS
6. HAVE YOU EVER DONE ANYTHING, STARTED TO DO ANYTHING, OR PREPARED TO DO ANYTHING TO END YOUR LIFE?: NO
TOTAL  NUMBER OF ABORTED OR SELF INTERRUPTED ATTEMPTS LIFETIME: NO
TOTAL  NUMBER OF INTERRUPTED ATTEMPTS LIFETIME: NO
ATTEMPT LIFETIME: NO
1. HAVE YOU WISHED YOU WERE DEAD OR WISHED YOU COULD GO TO SLEEP AND NOT WAKE UP?: NO
2. HAVE YOU ACTUALLY HAD ANY THOUGHTS OF KILLING YOURSELF?: NO

## 2023-01-09 ASSESSMENT — PATIENT HEALTH QUESTIONNAIRE - PHQ9: SUM OF ALL RESPONSES TO PHQ QUESTIONS 1-9: 4

## 2023-01-09 NOTE — PROGRESS NOTES
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 positively with any of the 11 DSM-5 criteria for a diagnostic impression of having a substance use disorder.    Collateral Contact Summary:   Collateral contacts contributing to this assessment:  The patient's electronic medical records were reviewed at time of assessment.    A voice message was left for this patient's sister, Kendal Moreno, on 1/10/2023 at 2:50 pm instructing her to call this counselor back.  The purpose of the call was to obtain collateral information needed to complete the patient's substance use disorder assessment.  This counselor will await a return call from Kendal.    This counselor talked with this patient's [facility] , Melchor Garcia, on 1/11/2023 at 10:50 am.  Melchor reported the patient was on court probation for a domestic assault charge which had occurred in 11/2021 and for a violation of a no contact order which had also occurred in 2021.  Melchor confirmed there was no indication of alcohol or drug use in either of the police reports regarding the patient's 2 legal charges.   Melchor reported the patient had 1  UA drug screen with court probation on 11/29/2022 and that UA drug screen had come back as negative for all screened drugs.  Melchor confirmed he had received a copy of the patient's legal medical THC/cannabis card in 11/2022.    This counselor talked with this patient's sister, Kendal Mroeno, on 1/11/2023 at 11:30 am.  Kendal reported she had been aware of her brother abusing mood altering chemicals in the past, but she reported to her best awareness it had been many years since her brother had abused any drugs.  Kendal stated she sees her brother between 1-2 times per week and she had not seen any indication he had been using any non-prescribed mood altering chemicals.  Kendal stated she was very proud of her brother continuing to maintain his recovery from his past drug abuse.    No additional collateral data had been obtained at the time of this documentation.     If court related records were reviewed, summarize here: None    Information from collateral contacts supported/largely agreed with information from the client and associated risk ratings.    Information in this assessment was obtained from the medical record and provided by the patient who is a fair historian.        The patient will have open access to his substance use disorder assessment medical record.    As evidenced by self report and criteria, the patient meets the following DSM-5 Diagnoses: (Sustained by DSM-5 Criteria Listed Above)      1.)  Amphetamine Use Disorder Severe - 304.40 (F15.20), in sustained remission  2.)  Depression NOS, per patient self-report  3.)  BPAD Type 2, per patient self-report  4.)  PTSD, per patient self-report    Specify if: In early remission:  After full criteria for alcohol/drug use disorder were previously met, none of the criteria for alcohol/drug use disorder have been met for at least 3 months but for less than 12 months (with the exception that Criterion A4,  Craving or a strong desire or urge to use  alcohol/drug  may be met).     In sustained remission:   After full criteria for alcohol use disorder were previously met, none of the criteria for alcohol/drug use disorder have been met at any time during a period of 12 months or longer (with the exception that Criterion A4,  Craving or strong desire or urge to use alcohol/drug  may be met).     Specify if:   This additional specifier is used if the individual is in an environment where access to alcohol is restricted.    Mild: Presence of 2-3 symptoms  Moderate: Presence of 4-5 symptoms  Severe: Presence of 6 or more symptoms    Recommendations:     1. Plan for Safety and Risk Management:     It was recommended the patient call 911 or go to the local Emergency Department should there be any significant change in the above risk factors.            Report to child / adult protection services was NA.    2. PAUL Referrals:      Recommendations:      1.)  It was recommended the patient continue to abstain from alcohol and from all other non-prescribed mood altering chemicals.    2.)  Follow all of the recommendations of his medical and mental health providers.  3.)  Follow all of the terms and conditions of his court probation, including participating in random alcohol and drug screening with court probation as directed.  4.)  Attend 12-step or other recovery support group meetings for additional support as needed.  5.)  Additionally, if the patient were unable to maintain abstinence from all non-prescribed mood altering chemicals, it would be recommended he have an updated substance use disorder assessment to potentially have additional recommendations made at that time.    The patient reported he was willing to follow the above recommendations.      The patient would like the following family or other support people involved in their treatment:  None at this time.  The patient does not have a recent history of opioid abuse.      3.  Mental Health Referrals:     The  patient would benefit from continuing to follow all of the recommendations of his mental health providers.    4. Cultural Concerns:    The patient did not identify having any cultural concerns regarding mental health, physical health, or substance use issues.     5. Recommendations for treatment focus:      Alcohol / Substance Use - See #2. PAUL Referrals above for details on recommendations.    Clinical Substantiation for the above recommendations: The patient reported he had maintained abstinence from methamphetamine and from all other non-prescribed mood altering chemicals since 2013.  The patient reported being prescribed medical THC/cannabis and he reported he has had a legal medical THC/cannabis card since 2021.  The patient would benefit from participating in random alcohol and drug screening with court probation as directed to verify his ongoing abstinence from non-prescribed mood altering chemicals.     Provider Name/ Credentials:  MELISSA Holcomb  January 9, 2023

## 2023-01-11 NOTE — PROGRESS NOTES
[address] [address]  1/11/2023    Reynaldo Branch  [address] [address]      Reynaldo,    This is to verify that Reynaldo Branch (1980) participated in a substance use disorder assessment via a video visit with JERRY Matthew/MELISSA at [facility] in [place], [place] on 1/9/2023.    Recommendations:  1.)  It was recommended the patient continue to abstain from alcohol and from all other non-prescribed mood altering chemicals.    2.)  Follow all of the recommendations of his medical and mental health providers.  3.)  Follow all of the terms and conditions of his court probation, including participating in random alcohol and drug screening with court probation as directed.  4.)  Attend 12-step or other recovery support group meetings for additional support as needed.  5.)  Additionally, if the patient were unable to maintain abstinence from all non-prescribed mood altering chemicals, it would be recommended he have an updated substance use disorder assessment to potentially have additional recommendations made at that time.    The patient reported he was willing to follow the above recommendations.      If you have any additional questions or concerns, please feel free to contact me by any of the contact methods listed below.    Sincerely,    JERRY Vega, [facility]  CD Evaluation Counselor  [address] [address]  [email or web address]  Tel: [phone]  Fax: [phone]

## 2023-01-26 DIAGNOSIS — K21.00 GASTROESOPHAGEAL REFLUX DISEASE WITH ESOPHAGITIS: ICD-10-CM

## 2023-01-26 RX ORDER — OMEPRAZOLE 40 MG/1
CAPSULE, DELAYED RELEASE ORAL
Qty: 30 CAPSULE | Refills: 0 | Status: SHIPPED | OUTPATIENT
Start: 2023-01-26 | End: 2023-02-27

## 2023-01-26 NOTE — TELEPHONE ENCOUNTER
Per [facility] Ambulatory Care Protocol, Pt is due for omeprazole  refill based on last refill date.   Pt has seen provider within the past year, or has new apt scheduled with provider. Patient has appt with Dr. Ramirez on 2/8/23.  Med refill script E-sent to pt's pharmacy.     Sulaiman Cherry RN  Infectious Disease

## 2023-02-03 NOTE — TELEPHONE ENCOUNTER
Called patient for refill reminder.    Will mail prescriptions address has been verified.     7 month of on time refill.    Last Filled on:  07/09/20  Follow-up Date:  09/09/20    Julisa Lucas CPhT  [facility]  [phone]          Epidermal Sutures: GluStitch

## 2023-02-08 ENCOUNTER — TELEPHONE (OUTPATIENT)
Dept: INFECTIOUS DISEASES | Facility: CLINIC | Age: 43
End: 2023-02-08

## 2023-02-20 ENCOUNTER — TELEPHONE (OUTPATIENT)
Dept: INFECTIOUS DISEASES | Facility: CLINIC | Age: 43
End: 2023-02-20

## 2023-02-27 DIAGNOSIS — K21.00 GASTROESOPHAGEAL REFLUX DISEASE WITH ESOPHAGITIS: ICD-10-CM

## 2023-02-27 RX ORDER — OMEPRAZOLE 40 MG/1
CAPSULE, DELAYED RELEASE ORAL
Qty: 30 CAPSULE | Refills: 3 | Status: SHIPPED | OUTPATIENT
Start: 2023-02-27 | End: 2023-03-24

## 2023-03-06 ENCOUNTER — TELEPHONE (OUTPATIENT)
Dept: INFECTIOUS DISEASES | Facility: CLINIC | Age: 43
End: 2023-03-06
Payer: COMMERCIAL

## 2023-03-10 ENCOUNTER — LAB (OUTPATIENT)
Dept: LAB | Facility: CLINIC | Age: 43
End: 2023-03-10
Payer: COMMERCIAL

## 2023-03-10 DIAGNOSIS — Z21 HUMAN IMMUNODEFICIENCY VIRUS I INFECTION (H): ICD-10-CM

## 2023-03-10 DIAGNOSIS — Z11.3 ROUTINE SCREENING FOR STI (SEXUALLY TRANSMITTED INFECTION): ICD-10-CM

## 2023-03-10 LAB
ALBUMIN SERPL BCG-MCNC: 4.8 G/DL (ref 3.5–5.2)
ALP SERPL-CCNC: 71 U/L (ref 40–129)
ALT SERPL W P-5'-P-CCNC: 11 U/L (ref 10–50)
ANION GAP SERPL CALCULATED.3IONS-SCNC: 9 MMOL/L (ref 7–15)
AST SERPL W P-5'-P-CCNC: 18 U/L (ref 10–50)
BASOPHILS # BLD AUTO: 0 10E3/UL (ref 0–0.2)
BASOPHILS NFR BLD AUTO: 0 %
BILIRUB SERPL-MCNC: 0.4 MG/DL
BUN SERPL-MCNC: 14.7 MG/DL (ref 6–20)
CALCIUM SERPL-MCNC: 9.3 MG/DL (ref 8.6–10)
CHLORIDE SERPL-SCNC: 104 MMOL/L (ref 98–107)
CREAT SERPL-MCNC: 1.42 MG/DL (ref 0.67–1.17)
DEPRECATED HCO3 PLAS-SCNC: 28 MMOL/L (ref 22–29)
EOSINOPHIL # BLD AUTO: 0.1 10E3/UL (ref 0–0.7)
EOSINOPHIL NFR BLD AUTO: 1 %
ERYTHROCYTE [DISTWIDTH] IN BLOOD BY AUTOMATED COUNT: 12.7 % (ref 10–15)
GFR SERPL CREATININE-BSD FRML MDRD: 62 ML/MIN/1.73M2
GLUCOSE SERPL-MCNC: 83 MG/DL (ref 70–99)
HCT VFR BLD AUTO: 38.8 % (ref 40–53)
HGB BLD-MCNC: 13.8 G/DL (ref 13.3–17.7)
IMM GRANULOCYTES # BLD: 0 10E3/UL
IMM GRANULOCYTES NFR BLD: 0 %
LYMPHOCYTES # BLD AUTO: 1.6 10E3/UL (ref 0.8–5.3)
LYMPHOCYTES NFR BLD AUTO: 26 %
MCH RBC QN AUTO: 34.2 PG (ref 26.5–33)
MCHC RBC AUTO-ENTMCNC: 35.6 G/DL (ref 31.5–36.5)
MCV RBC AUTO: 96 FL (ref 78–100)
MONOCYTES # BLD AUTO: 0.4 10E3/UL (ref 0–1.3)
MONOCYTES NFR BLD AUTO: 6 %
NEUTROPHILS # BLD AUTO: 3.9 10E3/UL (ref 1.6–8.3)
NEUTROPHILS NFR BLD AUTO: 67 %
NRBC # BLD AUTO: 0 10E3/UL
NRBC BLD AUTO-RTO: 0 /100
PLATELET # BLD AUTO: 188 10E3/UL (ref 150–450)
POTASSIUM SERPL-SCNC: 4.3 MMOL/L (ref 3.4–5.3)
PROT SERPL-MCNC: 7.1 G/DL (ref 6.4–8.3)
RBC # BLD AUTO: 4.03 10E6/UL (ref 4.4–5.9)
SODIUM SERPL-SCNC: 141 MMOL/L (ref 136–145)
WBC # BLD AUTO: 5.9 10E3/UL (ref 4–11)

## 2023-03-10 PROCEDURE — 86592 SYPHILIS TEST NON-TREP QUAL: CPT | Performed by: PATHOLOGY

## 2023-03-10 PROCEDURE — 99000 SPECIMEN HANDLING OFFICE-LAB: CPT | Performed by: PATHOLOGY

## 2023-03-10 PROCEDURE — 86359 T CELLS TOTAL COUNT: CPT | Performed by: PATHOLOGY

## 2023-03-10 PROCEDURE — 86780 TREPONEMA PALLIDUM: CPT | Performed by: PATHOLOGY

## 2023-03-10 PROCEDURE — 86360 T CELL ABSOLUTE COUNT/RATIO: CPT | Performed by: PATHOLOGY

## 2023-03-10 PROCEDURE — 85025 COMPLETE CBC W/AUTO DIFF WBC: CPT | Performed by: PATHOLOGY

## 2023-03-10 PROCEDURE — 80053 COMPREHEN METABOLIC PANEL: CPT | Performed by: PATHOLOGY

## 2023-03-10 PROCEDURE — 86780 TREPONEMA PALLIDUM: CPT | Mod: 90 | Performed by: PATHOLOGY

## 2023-03-10 PROCEDURE — 36415 COLL VENOUS BLD VENIPUNCTURE: CPT | Performed by: PATHOLOGY

## 2023-03-10 PROCEDURE — 87536 HIV-1 QUANT&REVRSE TRNSCRPJ: CPT | Performed by: PATHOLOGY

## 2023-03-11 LAB
CD3 CELLS # BLD: 1287 CELLS/UL (ref 603–2990)
CD3 CELLS NFR BLD: 83 % (ref 49–84)
CD3+CD4+ CELLS # BLD: 627 CELLS/UL (ref 441–2156)
CD3+CD4+ CELLS NFR BLD: 40 % (ref 28–63)
CD3+CD4+ CELLS/CD3+CD8+ CLL BLD: 0.93 % (ref 1.4–2.6)
CD3+CD8+ CELLS # BLD: 677 CELLS/UL (ref 125–1312)
CD3+CD8+ CELLS NFR BLD: 44 % (ref 10–40)
T CELL COMMENT: ABNORMAL
T PALLIDUM AB SER QL: REACTIVE

## 2023-03-13 LAB — RPR SER QL: NONREACTIVE

## 2023-03-15 LAB
HIV1 RNA # PLAS NAA DL=20: <20 COPIES/ML
HIV1 RNA SERPL NAA+PROBE-LOG#: <1.3 {LOG_COPIES}/ML
T PALLIDUM AB SER QL AGGL: REACTIVE

## 2023-03-24 ENCOUNTER — ANCILLARY PROCEDURE (OUTPATIENT)
Dept: GENERAL RADIOLOGY | Facility: CLINIC | Age: 43
End: 2023-03-24
Attending: INTERNAL MEDICINE
Payer: COMMERCIAL

## 2023-03-24 ENCOUNTER — LAB (OUTPATIENT)
Dept: LAB | Facility: CLINIC | Age: 43
End: 2023-03-24
Payer: COMMERCIAL

## 2023-03-24 ENCOUNTER — OFFICE VISIT (OUTPATIENT)
Dept: INFECTIOUS DISEASES | Facility: CLINIC | Age: 43
End: 2023-03-24
Attending: INTERNAL MEDICINE
Payer: COMMERCIAL

## 2023-03-24 VITALS
DIASTOLIC BLOOD PRESSURE: 77 MMHG | TEMPERATURE: 97.8 F | HEART RATE: 68 BPM | BODY MASS INDEX: 23.71 KG/M2 | WEIGHT: 174.8 LBS | OXYGEN SATURATION: 97 % | SYSTOLIC BLOOD PRESSURE: 125 MMHG

## 2023-03-24 DIAGNOSIS — M79.671 CHRONIC FOOT PAIN, RIGHT: ICD-10-CM

## 2023-03-24 DIAGNOSIS — Z00.00 HEALTHCARE MAINTENANCE: ICD-10-CM

## 2023-03-24 DIAGNOSIS — K02.9 DENTAL DECAY: ICD-10-CM

## 2023-03-24 DIAGNOSIS — F19.91 HISTORY OF DRUG USE: ICD-10-CM

## 2023-03-24 DIAGNOSIS — G89.29 CHRONIC FOOT PAIN, RIGHT: ICD-10-CM

## 2023-03-24 DIAGNOSIS — R79.89 ELEVATED SERUM CREATININE: ICD-10-CM

## 2023-03-24 DIAGNOSIS — Z21 HUMAN IMMUNODEFICIENCY VIRUS I INFECTION (H): Primary | ICD-10-CM

## 2023-03-24 DIAGNOSIS — Z86.16 HISTORY OF COVID-19: ICD-10-CM

## 2023-03-24 LAB
ALBUMIN UR-MCNC: 30 MG/DL
AMORPH CRY #/AREA URNS HPF: ABNORMAL /HPF
AMPHETAMINES UR QL SCN: ABNORMAL
APPEARANCE UR: ABNORMAL
BARBITURATES UR QL SCN: ABNORMAL
BENZODIAZ UR QL SCN: ABNORMAL
BILIRUB UR QL STRIP: NEGATIVE
BZE UR QL SCN: ABNORMAL
CANNABINOIDS UR QL SCN: ABNORMAL
COLOR UR AUTO: YELLOW
ETHANOL UR QL SCN: ABNORMAL
GLUCOSE UR STRIP-MCNC: NEGATIVE MG/DL
HGB UR QL STRIP: NEGATIVE
KETONES UR STRIP-MCNC: NEGATIVE MG/DL
LEUKOCYTE ESTERASE UR QL STRIP: NEGATIVE
MUCOUS THREADS #/AREA URNS LPF: PRESENT /LPF
NITRATE UR QL: NEGATIVE
OPIATES UR QL SCN: ABNORMAL
PCP QUAL URINE (ROCHE): ABNORMAL
PH UR STRIP: 7 [PH] (ref 5–7)
RBC URINE: 0 /HPF
SP GR UR STRIP: 1.02 (ref 1–1.03)
UROBILINOGEN UR STRIP-MCNC: 2 MG/DL
WBC URINE: <1 /HPF

## 2023-03-24 PROCEDURE — 80307 DRUG TEST PRSMV CHEM ANLYZR: CPT | Performed by: PATHOLOGY

## 2023-03-24 PROCEDURE — 99000 SPECIMEN HANDLING OFFICE-LAB: CPT | Performed by: PATHOLOGY

## 2023-03-24 PROCEDURE — 73630 X-RAY EXAM OF FOOT: CPT | Mod: RT | Performed by: RADIOLOGY

## 2023-03-24 PROCEDURE — 99212 OFFICE O/P EST SF 10 MIN: CPT | Performed by: INTERNAL MEDICINE

## 2023-03-24 PROCEDURE — 99214 OFFICE O/P EST MOD 30 MIN: CPT | Performed by: INTERNAL MEDICINE

## 2023-03-24 RX ORDER — PAROXETINE 20 MG/1
TABLET, FILM COATED ORAL EVERY MORNING
COMMUNITY

## 2023-03-24 RX ORDER — EMTRICITABINE AND TENOFOVIR ALAFENAMIDE 200; 25 MG/1; MG/1
1 TABLET ORAL DAILY
Qty: 30 TABLET | Refills: 11 | Status: SHIPPED | OUTPATIENT
Start: 2023-03-24 | End: 2024-06-19

## 2023-03-24 ASSESSMENT — PAIN SCALES - GENERAL: PAINLEVEL: NO PAIN (0)
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
## 2023-03-24 NOTE — NURSING NOTE
Chief Complaint   Patient presents with     RECHECK       /77   Pulse 68   Temp 97.8  F (36.6  C) (Oral)   Wt 79.3 kg (174 lb 12.8 oz)   SpO2 97%   BMI 23.71 kg/m      Tu Solorzano on 3/24/2023 at 3:56 PM
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
## 2023-04-23 ENCOUNTER — HEALTH MAINTENANCE LETTER (OUTPATIENT)
Age: 43
End: 2023-04-23

## 2023-05-23 DIAGNOSIS — F41.9 ANXIETY AND DEPRESSION: Primary | ICD-10-CM

## 2023-05-23 DIAGNOSIS — K21.00 GASTROESOPHAGEAL REFLUX DISEASE WITH ESOPHAGITIS: ICD-10-CM

## 2023-05-23 DIAGNOSIS — F32.A ANXIETY AND DEPRESSION: Primary | ICD-10-CM

## 2023-05-24 RX ORDER — OMEPRAZOLE 40 MG/1
CAPSULE, DELAYED RELEASE ORAL
Qty: 30 CAPSULE | Refills: 1 | Status: SHIPPED | OUTPATIENT
Start: 2023-05-24 | End: 2023-07-20

## 2023-07-20 DIAGNOSIS — K21.00 GASTROESOPHAGEAL REFLUX DISEASE WITH ESOPHAGITIS: ICD-10-CM

## 2023-07-20 RX ORDER — OMEPRAZOLE 40 MG/1
CAPSULE, DELAYED RELEASE ORAL
Qty: 90 CAPSULE | Refills: 3 | Status: SHIPPED | OUTPATIENT
Start: 2023-07-20

## 2023-07-20 NOTE — TELEPHONE ENCOUNTER
Per [facility] Ambulatory Care Protocol, Pt is due for refill based on last refill date.   Pt has seen provider within the past year, or has appt scheduled with provider.     Med refill script E-sent to pt's pharmacy.    Signed Prescriptions:                        Disp   Refills    omeprazole (PRILOSEC) 40 MG DR capsule     90 cap*3        Sig: TAKE ONE CAPSULE BY MOUTH EVERY NIGHT AT BEDTIME  Authorizing Provider: MARIA A MÉNDEZ  Ordering User: PASTORA NAVAS RN  Infectious Disease 07/20/23 11:49 AM    ;'

## 2023-09-25 ENCOUNTER — TELEPHONE (OUTPATIENT)
Dept: INFECTIOUS DISEASES | Facility: CLINIC | Age: 43
End: 2023-09-25
Payer: COMMERCIAL

## 2023-09-25 NOTE — TELEPHONE ENCOUNTER
SILVIA LVM 9/25 to sched a 1 year (around 3/24/24) follow up with Dr. Ramirez per checkout notes from 3/24.

## 2023-09-27 DIAGNOSIS — F32.A ANXIETY AND DEPRESSION: ICD-10-CM

## 2023-09-27 DIAGNOSIS — F41.9 ANXIETY AND DEPRESSION: ICD-10-CM

## 2024-02-20 DIAGNOSIS — R19.7 DIARRHEA, UNSPECIFIED TYPE: ICD-10-CM

## 2024-03-06 RX ORDER — LOPERAMIDE HCL 2 MG
CAPSULE ORAL
Qty: 120 CAPSULE | Refills: 3 | Status: SHIPPED | OUTPATIENT
Start: 2024-03-06

## 2024-04-15 ENCOUNTER — PATIENT OUTREACH (OUTPATIENT)
Dept: INFECTIOUS DISEASES | Facility: CLINIC | Age: 44
End: 2024-04-15
Payer: COMMERCIAL

## 2024-04-15 NOTE — TELEPHONE ENCOUNTER
"Social Work - Intervention  [facility]    Data/Intervention: 4/15/2024    Patient Name: Reynaldo Branch Goes By: Reynaldo  he/him/his  /Age: 1980 (43 year old)     Visit Type: Telephone  Referral Source: Pharmacy  Reason for Referral: Crisis     Collaborated With:    -Julisa Lucas, Pharmacy   -Parul Alva RN      Psychosocial Information/Concerns:  Writer received referral from Pharmacy noting:     \"Reynaldo sounds like he could be in crisis with a lot of things going on at once. Here is the email I received today.     \"I don't know who else to ask. Terrance pierre can't locate anymore. I need to get a hold of a . I am going to get evicted from my apartment. I'm so tired all the time I can't stay awake.  My feet hurt so bad after walking for a short time. I can't stay healthy anymore. I can't work 3 jobs anymore. I can't live like this anymore. I need help please if you know where to turn. I am sorry to ask.\"      Intervention/Education/Resources Provided:  Writer contacted Patient and was able to speak to him about the concerns he noted in his email. Patient noted he was struggling financially. Writer and Patient reviewed financial resources available to him. Patient reports he applied for emergency assistance to pay for a bill last week, but now he wants emergency assistance to pay his rent instead. Writer suggested he contact the [facility] to update his application. [employer] was also discussed as a resource available to him to address his needs.     No safety concerns reported. Patient reports he believes his health is deteriorating and he may be unable to work in the near future. Writer provided supportive listening and recommended he connect with Dr. Ramirez to address his medical concerns. RN made Patient an appointment for  at 5 pm with Dr. Ramirez.      Assessment/Plan:  Writer confirmed Patient planned to attend his appointment with his provider on  at 5 pm. Patient " denied needing transportation assistance to get to this appointment. Writer will continue to be available if further assistance is needed.      Provided Patient with contact information and availability.    DALIA Bergman, [facility]  Infectious Disease

## 2024-04-19 ENCOUNTER — TELEPHONE (OUTPATIENT)
Dept: INFECTIOUS DISEASES | Facility: CLINIC | Age: 44
End: 2024-04-19
Payer: COMMERCIAL

## 2024-04-19 NOTE — TELEPHONE ENCOUNTER
[facility] MD Telephone Note:    Mr. Branch had a conversation with A Juanita , earlier this week but subsequently missed a scheduled appointment with me on 4/17/24.  After a couple of tries, I have not yet been able to reach him on the phone (although did leave one voicemail message) but will try again next week.  Yury Ramirez

## 2024-04-22 ENCOUNTER — PATIENT OUTREACH (OUTPATIENT)
Dept: INFECTIOUS DISEASES | Facility: CLINIC | Age: 44
End: 2024-04-22
Payer: COMMERCIAL

## 2024-04-22 ENCOUNTER — TELEPHONE (OUTPATIENT)
Dept: INFECTIOUS DISEASES | Facility: CLINIC | Age: 44
End: 2024-04-22

## 2024-04-22 NOTE — TELEPHONE ENCOUNTER
Chelsey Grant, LICSW  P [facility] Infectious Disease Adult [facility]  Hello,    Can you please give this patient a call (see note from this morning for more details). Patient reports symptoms that may be concerning so sending it your way. Patient is frustrated, does not have current insurance but has applied for MA. I have also been helping him with financial resources, I attempted to reschedule his missed appointment last week, but he declined.    Chelsey

## 2024-04-22 NOTE — TELEPHONE ENCOUNTER
Social Work - Follow-Up  [facility]    Data/Intervention: 2024    Patient Name: Reynaldo Branch Goes By: Reynaldo  he/him/his  /Age: 1980 (43 year old)    Reason for Follow-Up:  Follow Up/Missed Appointment     Collaborated With:    -Patient     Intervention/Education/Resources Provided:  Writer called to follow up on Patient. Patient reports feeling overwhelmed and frustrated. Patient reports he is not working and is financially strained. Resources reviewed again. Patient notes that [employer] is going to assist him with some financial concerns. Patient stated he was not sure if his county application was processed for emergency assistance yet.     Patient stated he was experiencing medical symptoms. Writer offered to reschedule his missed appointment and transfer him to a nurse to discuss symptoms to see if emergency care was needed. Patient stated he did not want to reschedule, noting that he does not have insurance. Writer reviewed MA, Patient reporting he applied. Patient shared his frustrations that he is billed every time he comes to the clinic and that he does not want to pay bills right now. Writer offered to assist Patient in connecting with the billing department for a solution, Patient declined.     Assessment/Plan:  Writer will connect Patient with nurse to discuss medical symptoms. Patient did not want to schedule an appointment with Provider at this time, but asked that Dr. Ramirez call him.     Previously provided Patient with writer's contact information and availability.      DALIA Bergman, [facility]  Infectious Disease

## 2024-04-22 NOTE — TELEPHONE ENCOUNTER
"Called patient to discuss symptoms. He reports vomiting every morning. Thought it was medications causing but unsure. Vomiting in the mornings has been going on for last year or so, but now is more frequent. Describes vomit as \"brown\" because it happens after he drinks his coffee, but denies coffee-ground appearing emesis. Also reports that he is coughing up \"dirt\" and states there is \"dirt in my lungs\". States the \"dirt\" in his mucous is new within the last month or so. Patient denies any presence of this \"dirt\" substance in his vomit. Patient states \"my nose leaks like a faucet. Constantly pouring out\" when describing his increased mucous production. Patient also voices concerns about pain in legs and states \"there is no circulation in my legs at all.\" He is concerns about varicose veins and states he cannot be on his feet too long without pain.     When discussing patient scheduling an appointment, it sounds like patient is waiting to get insurance. He reports he was denied insurance but wants to \"wait it out\" so he doesn't keep getting billed. Patient states: \"If I can't feel better I don't wanna live like this no more, you know what I mean? I'm not looking for drugs I just want a better life.\" Patient was assessed prior by JUD Bergman for mental health concerns.     Informed patient that if his vomiting frequency increases, that he needs to be seen in the ED. Patient agreeable to this.     Will route to provider to inform him of patient's current symptoms.   "

## 2024-06-05 DIAGNOSIS — Z21 HUMAN IMMUNODEFICIENCY VIRUS I INFECTION (H): ICD-10-CM

## 2024-06-05 NOTE — TELEPHONE ENCOUNTER
"Medication Refill                                                     Refill request receive for:  Descovy and Tivicay    Last refill: 03/24/2023    Last Office Visit 3/24/2023  Future appt scheduled? No     POC for medication if indicated from last note including duration of treatment if applicable: Routing to provider for approval as patient has not been seen in over one year and declined scheduling follow up (multiple attempts made)      RECENT LABS/VITALS                                                        Lab Results   Component Value Date    AST 18 03/10/2023    AST 21 04/24/2019     Lab Results   Component Value Date    ALT 11 03/10/2023    ALT 20 04/24/2019     Creatinine   Date Value Ref Range Status   03/10/2023 1.42 (H) 0.67 - 1.17 mg/dL Final   04/24/2019 1.44 (H) 0.66 - 1.25 mg/dL Final   ]  Alkaline Phosphatase   Date/Time Value Ref Range Status   03/10/2023 04:05 PM 71 40 - 129 U/L Final   04/24/2019 06:52 PM 79 40 - 150 U/L Final     No results found for: \"LABAPCBCDIFF\"                  "

## 2024-06-13 ENCOUNTER — PATIENT OUTREACH (OUTPATIENT)
Dept: INFECTIOUS DISEASES | Facility: CLINIC | Age: 44
End: 2024-06-13
Payer: COMMERCIAL

## 2024-06-13 NOTE — TELEPHONE ENCOUNTER
Social Work - Telephone/[employer]hart message  [facility]  Data: 2024  Patient Name: Reynaldo Branch  Goes By: Reynaldo  he/him/his  /Age: 1980 (43 year old)      Referral Source: Pharmacy     Reason for Referral: Medication/Appointment     Intervention:  Pharmacy sent a message to the team regarding Patient needing a visit with his provider in order to obtain refills. Writer called Patient to check in and address this issue. Voicemail left. RN Team put a hold on Dr. Ramirez's schedule for  at 6 pm in person for the Patient.   Plan:  will await patient's return phone call/message and provide assistance at that time.    **Patient called scheduling a short time later and took the  appointment. Labs ordered by RN Team .     DALIA Bergman, [facility]  Infectious Disease

## 2024-06-13 NOTE — TELEPHONE ENCOUNTER
Patient scheduled next available appt with Dr. Ramirez. States he has been off his meds for almost a month now and needs a refill ASAP. Please follow-up as needed. Thank you    emtricitabine-tenofovir AF (DESCOVY) 200-25 MG per tablet   dolutegravir (TIVICAY) 50 MG tablet     [facility] [place], [place] - [address] [place] [address] [phone]

## 2024-06-19 RX ORDER — EMTRICITABINE AND TENOFOVIR ALAFENAMIDE 200; 25 MG/1; MG/1
1 TABLET ORAL DAILY
Qty: 30 TABLET | Refills: 11 | Status: SHIPPED | OUTPATIENT
Start: 2024-06-19

## 2024-06-19 RX ORDER — DOLUTEGRAVIR SODIUM 50 MG/1
50 TABLET, FILM COATED ORAL DAILY
Qty: 30 TABLET | Refills: 11 | Status: SHIPPED | OUTPATIENT
Start: 2024-06-19

## 2024-06-29 ENCOUNTER — HEALTH MAINTENANCE LETTER (OUTPATIENT)
Age: 44
End: 2024-06-29

## 2024-07-10 ENCOUNTER — PATIENT OUTREACH (OUTPATIENT)
Dept: INFECTIOUS DISEASES | Facility: CLINIC | Age: 44
End: 2024-07-10
Payer: COMMERCIAL

## 2024-07-10 NOTE — TELEPHONE ENCOUNTER
Social Work - Telephone  [facility]  Data: 7/10/2024  Patient Name: Reynaldo Branch  Goes By: Reynaldo  he/him/his  /Age: 1980 (43 year old)        Reason for Referral: Missed Appointment 2024    Intervention: Called Patient to check in and assess for barriers to care. Patient answered call and hung up during introduction.   Plan: Patient last seen in clinic in 2023, patient will need to reschedule appointment and labs.  Chart review notes that patient received a years worth of refills in 2024.     DALIA Bergman, [facility]  Infectious Disease

## 2024-09-25 ENCOUNTER — PATIENT OUTREACH (OUTPATIENT)
Dept: INFECTIOUS DISEASES | Facility: CLINIC | Age: 44
End: 2024-09-25
Payer: COMMERCIAL

## 2024-09-25 NOTE — TELEPHONE ENCOUNTER
Social Work - Telephone  [facility]  Data: 2024  Patient Name: Reynaldo Branch  Goes By: Reynaldo  he/him/his  /Age: 1980 (43 year old)        Reason for Referral: Engagement     Intervention: Called Patient to check in and assess for barriers to care. Left voicemail with instructions on how to schedule appointment with Provider and to call Writer if there is any concern about ability to attend appointments.   Plan:  will await patient's return phone call/message and provide assistance at that time.       DALIA Bergman, [facility]  Infectious Disease

## 2024-11-07 ENCOUNTER — TELEPHONE (OUTPATIENT)
Dept: INFECTIOUS DISEASES | Facility: CLINIC | Age: 44
End: 2024-11-07
Payer: COMMERCIAL

## 2024-11-07 DIAGNOSIS — K21.00 GASTROESOPHAGEAL REFLUX DISEASE WITH ESOPHAGITIS: ICD-10-CM

## 2024-11-07 RX ORDER — OMEPRAZOLE 40 MG/1
CAPSULE, DELAYED RELEASE ORAL
Qty: 90 CAPSULE | Refills: 3 | Status: SHIPPED | OUTPATIENT
Start: 2024-11-07

## 2024-11-07 NOTE — TELEPHONE ENCOUNTER
SILVIA LVM 11/7 to sched a next avail follow up with Dr. Ramirez for med refills. Last seen in 3/2023.       ----- Message from Jovanna GIBBS sent at 11/7/2024 11:37 AM CST -----  Hello!    Can we please schedule patient for next avail with Dr Ramirez?     Thanks!

## 2025-01-02 ENCOUNTER — TELEPHONE (OUTPATIENT)
Dept: INFECTIOUS DISEASES | Facility: CLINIC | Age: 45
End: 2025-01-02
Payer: COMMERCIAL

## 2025-01-02 NOTE — TELEPHONE ENCOUNTER
SILVIA WARD + sent a MyC msg 1/2 to sched a next avail follow up with Dr. Ramirez for medication refills. Last seen in 3/2023.

## 2025-02-03 DIAGNOSIS — R19.7 DIARRHEA, UNSPECIFIED TYPE: ICD-10-CM

## 2025-02-03 RX ORDER — LOPERAMIDE HYDROCHLORIDE 2 MG/1
CAPSULE ORAL
Qty: 120 CAPSULE | Refills: 3 | OUTPATIENT
Start: 2025-02-03

## 2025-02-04 ENCOUNTER — TELEPHONE (OUTPATIENT)
Dept: INFECTIOUS DISEASES | Facility: CLINIC | Age: 45
End: 2025-02-04
Payer: COMMERCIAL

## 2025-02-04 NOTE — TELEPHONE ENCOUNTER
SILVIA WARD 2/4 to sched a next avail follow up with Dr. Ramirez per Chelsey.       ----- Message from Chelsey Grant sent at 2/3/2025  3:19 PM CST -----  Regarding: FW: MAE  Can you please call Reynaldo to get him scheduled with Dr. Ramirez? He is overdue for an appointment.     Thank you!  Chelsey  ----- Message -----  From: Faith Woods RN  Sent: 2/3/2025   3:10 PM CST  To: JUD Bergman  Subject: MAE                                              Lost to follow-up. Has continued w meds but hasn't been seen since 10/2023  Thanksfaith

## 2025-03-31 ENCOUNTER — MYC MEDICAL ADVICE (OUTPATIENT)
Dept: INFECTIOUS DISEASES | Facility: CLINIC | Age: 45
End: 2025-03-31
Payer: COMMERCIAL

## 2025-03-31 ENCOUNTER — TELEPHONE (OUTPATIENT)
Dept: INFECTIOUS DISEASES | Facility: CLINIC | Age: 45
End: 2025-03-31
Payer: COMMERCIAL

## 2025-03-31 DIAGNOSIS — Z21 HUMAN IMMUNODEFICIENCY VIRUS I INFECTION (H): Primary | ICD-10-CM

## 2025-03-31 DIAGNOSIS — Z11.3 ROUTINE SCREENING FOR STI (SEXUALLY TRANSMITTED INFECTION): ICD-10-CM

## 2025-03-31 DIAGNOSIS — R06.02 SHORTNESS OF BREATH: Primary | ICD-10-CM

## 2025-03-31 DIAGNOSIS — B20 HUMAN IMMUNODEFICIENCY VIRUS (HIV) DISEASE (H): ICD-10-CM

## 2025-03-31 NOTE — TELEPHONE ENCOUNTER
Will call patient to inquire about his symptoms. RN ordered HIV and STI panels per protocol.   He's been lost to care for 2 years.

## 2025-03-31 NOTE — TELEPHONE ENCOUNTER
"Patient response to RN:  \"I will work on getting my labs done but I need to get a chest x-ray as soon as possible. I Have a strong history of lung cancer in my family. Something is off with me and I can't explain it. I have been sick for over a week. I sleep all the time, but I don't feel sick. Just no energy. I work two jobs, but yet I am slowing down quickly. Can someone order a chest x-ray or some sort of test to check for cancer. Thanks\"             Message sent via scheduling encounter (came in before RN messaged patient)    RN sent [employer] message asking when she can call as he has been lost to care and very difficult to reach via telephone.        "

## 2025-03-31 NOTE — TELEPHONE ENCOUNTER
Patient explains he has difficulty talking and breathing. He wants to be screened for lung cancer as he has a family history of it.     Patient agreeable to get labs done. He wants a chest-xray. He has a feeling unwell for a week, but not sick. He is very fatigued/lack of energy and sleeps a lot.

## 2025-04-01 NOTE — TELEPHONE ENCOUNTER
"[facility] MD Telephone Note:    I phoned Mr. Branch regarding his current health concerns.  He says that he was at a training event for his workplace in [place] for several days returning by plane on ~ 3/19/25 and felt well until he got home when he started having fatigue, increased mucus, congestion, and infrequent moments of dyspnea, although he has been able to keep up with his two-job work schedule.  He also says he had at least one episode of some dark (possibly old blood) colored mucus.  He says he feels better today and is not having any breathing difficulties at all today.  In addition, he has had at least a couple of months of intermittent dental discomfort with an \"abscess\" that keeps opening and draining and then closing.  He says he does not have dental insurance so scheduling a dental appointment is \"difficult\".  Finally, he says that for the past several months he has had occasional intermittent non-focal abdominal aches (when he sits for a prolonged period of time, such as on the airplane for more than a couple of hours).  He is not having fever, chills, cognitive difficulties, significant nausea or anorexia, or other worrisome symptoms.  He is somewhat anxious about a family history of lung cancer.    At this point, it does not sound from over the phone conversation that he has a more serious problem than a resolving URI plus a significant dental inflammation / infection, so an immediate appointment in clinic does not sound to be needed, but he is overdue for a follow-up (and HIV laboratory monitoring) in general.    Plan:  - He will try to come in for an overdue laboratory blood tests draw (last had HIV monitoring laboratory studies in 3/2023) within the next week or so.  - I asked him to call our clinic to schedule tomorrow for a next available appointment with me (likely in late May).  - We will obtain a chest x-ray at the same time he has his blood drawn (which he requests .  - I " vigorously encouraged him to phone several dental offices to schedule an appointment with them as soon as possible, emphasizing to them that he has an abscess so needs an early appointment..  - We will include liver / pancreas / kidney blood assays in his upcoming lab tests to screen for any abnormality.  (If those are all normal, will consider an empiric course of PPI therapy, since he has a past history of GERD.)  - He is asked to contact us again if he develops new marked symptoms, fever or other new constitutional symptoms, or evidence of sustained bleeding.  -

## 2025-04-08 ENCOUNTER — TELEPHONE (OUTPATIENT)
Dept: INFECTIOUS DISEASES | Facility: CLINIC | Age: 45
End: 2025-04-08
Payer: COMMERCIAL

## 2025-04-08 NOTE — TELEPHONE ENCOUNTER
EP LVM 4/8 to sched a lab + CXR within this week or next and sched next avail follow up with Dr. Ramirez. --2nd attempt.

## 2025-06-27 DIAGNOSIS — Z21 HUMAN IMMUNODEFICIENCY VIRUS I INFECTION (H): ICD-10-CM

## 2025-06-30 ENCOUNTER — TELEPHONE (OUTPATIENT)
Dept: INFECTIOUS DISEASES | Facility: CLINIC | Age: 45
End: 2025-06-30
Payer: COMMERCIAL

## 2025-06-30 RX ORDER — DOLUTEGRAVIR SODIUM 50 MG/1
50 TABLET, FILM COATED ORAL DAILY
Qty: 30 TABLET | Refills: 0 | Status: SHIPPED | OUTPATIENT
Start: 2025-06-30

## 2025-06-30 RX ORDER — EMTRICITABINE AND TENOFOVIR ALAFENAMIDE 200; 25 MG/1; MG/1
1 TABLET ORAL DAILY
Qty: 30 TABLET | Refills: 11 | OUTPATIENT
Start: 2025-06-30

## 2025-06-30 RX ORDER — DOLUTEGRAVIR SODIUM 50 MG/1
50 TABLET, FILM COATED ORAL DAILY
Qty: 30 TABLET | Refills: 11 | OUTPATIENT
Start: 2025-06-30

## 2025-06-30 RX ORDER — EMTRICITABINE AND TENOFOVIR ALAFENAMIDE 200; 25 MG/1; MG/1
1 TABLET ORAL DAILY
Qty: 30 TABLET | Refills: 0 | Status: SHIPPED | OUTPATIENT
Start: 2025-06-30

## 2025-06-30 NOTE — TELEPHONE ENCOUNTER
Pt medication refill request. Pt failed protocol. Pt has not been seen since 3/24/2023.     Pt has appt on 7/8/25 with Dr Ramirez.   Writer declined refill and message routed to the clin coor to to schedule labs prior to 7/8 appt.     Lab orders are placed.     Sharon Nash RN

## 2025-06-30 NOTE — TELEPHONE ENCOUNTER
[facility] MD Telephone Note:    I also phoned Mr. Branch this afternoon and left a voice mail message for him that he presently has an appointment scheduled with me for 7/8/25, that he has not been seen or had labs drawn for > two years, and that this needs to occur for us to continue to provide his antiretroviral medications.  I encouraged him to contact us if there is some reason he can not make the 7/8/25 appointment.  I also suggested that he try to get his laboratory studies done before that appointment, if possible.    Yury Ramirez MD

## 2025-06-30 NOTE — TELEPHONE ENCOUNTER
Pt replied and upset his medication has been declined. Writer's message stated to make a lab appt and the refill will be discussed in 7 days. Routed to Ashley and Isabel Aj.    Sharon Nash RN

## 2025-07-01 ENCOUNTER — ANCILLARY PROCEDURE (OUTPATIENT)
Dept: GENERAL RADIOLOGY | Facility: CLINIC | Age: 45
End: 2025-07-01
Attending: INTERNAL MEDICINE
Payer: MEDICAID

## 2025-07-01 ENCOUNTER — LAB (OUTPATIENT)
Dept: LAB | Facility: CLINIC | Age: 45
End: 2025-07-01
Payer: MEDICAID

## 2025-07-01 DIAGNOSIS — Z21 HUMAN IMMUNODEFICIENCY VIRUS I INFECTION (H): ICD-10-CM

## 2025-07-01 DIAGNOSIS — B20 HUMAN IMMUNODEFICIENCY VIRUS (HIV) DISEASE (H): ICD-10-CM

## 2025-07-01 DIAGNOSIS — R06.02 SHORTNESS OF BREATH: ICD-10-CM

## 2025-07-01 DIAGNOSIS — Z11.3 ROUTINE SCREENING FOR STI (SEXUALLY TRANSMITTED INFECTION): ICD-10-CM

## 2025-07-01 LAB
ALBUMIN SERPL BCG-MCNC: 4.6 G/DL (ref 3.5–5.2)
ALP SERPL-CCNC: 87 U/L (ref 40–150)
ALT SERPL W P-5'-P-CCNC: 9 U/L (ref 0–70)
ANION GAP SERPL CALCULATED.3IONS-SCNC: 11 MMOL/L (ref 7–15)
AST SERPL W P-5'-P-CCNC: 14 U/L (ref 0–45)
BASOPHILS # BLD AUTO: 0 10E3/UL (ref 0–0.2)
BASOPHILS NFR BLD AUTO: 1 %
BILIRUB SERPL-MCNC: 0.3 MG/DL
BUN SERPL-MCNC: 13.5 MG/DL (ref 6–20)
CALCIUM SERPL-MCNC: 9.5 MG/DL (ref 8.8–10.4)
CD3 CELLS # BLD: 1386 CELLS/UL (ref 603–2990)
CD3 CELLS NFR BLD: 81 % (ref 49–84)
CD3+CD4+ CELLS # BLD: 769 CELLS/UL (ref 441–2156)
CD3+CD4+ CELLS NFR BLD: 45 % (ref 28–63)
CD3+CD4+ CELLS/CD3+CD8+ CLL BLD: 1.19 % (ref 1.4–2.6)
CD3+CD8+ CELLS # BLD: 646 CELLS/UL (ref 125–1312)
CD3+CD8+ CELLS NFR BLD: 38 % (ref 10–40)
CHLORIDE SERPL-SCNC: 106 MMOL/L (ref 98–107)
CHOLEST SERPL-MCNC: 171 MG/DL
CREAT SERPL-MCNC: 1.46 MG/DL (ref 0.67–1.17)
EGFRCR SERPLBLD CKD-EPI 2021: 60 ML/MIN/1.73M2
EOSINOPHIL # BLD AUTO: 0.1 10E3/UL (ref 0–0.7)
EOSINOPHIL NFR BLD AUTO: 1 %
ERYTHROCYTE [DISTWIDTH] IN BLOOD BY AUTOMATED COUNT: 12.6 % (ref 10–15)
FASTING STATUS PATIENT QL REPORTED: ABNORMAL
FASTING STATUS PATIENT QL REPORTED: ABNORMAL
GLUCOSE SERPL-MCNC: 84 MG/DL (ref 70–99)
HCO3 SERPL-SCNC: 24 MMOL/L (ref 22–29)
HCT VFR BLD AUTO: 41.8 % (ref 40–53)
HDLC SERPL-MCNC: 37 MG/DL
HGB BLD-MCNC: 14.5 G/DL (ref 13.3–17.7)
IMM GRANULOCYTES # BLD: 0 10E3/UL
IMM GRANULOCYTES NFR BLD: 0 %
LDLC SERPL CALC-MCNC: 106 MG/DL
LIPASE SERPL-CCNC: 29 U/L (ref 13–60)
LYMPHOCYTES # BLD AUTO: 1.5 10E3/UL (ref 0.8–5.3)
LYMPHOCYTES NFR BLD AUTO: 24 %
MCH RBC QN AUTO: 33.1 PG (ref 26.5–33)
MCHC RBC AUTO-ENTMCNC: 34.7 G/DL (ref 31.5–36.5)
MCV RBC AUTO: 95 FL (ref 78–100)
MONOCYTES # BLD AUTO: 0.5 10E3/UL (ref 0–1.3)
MONOCYTES NFR BLD AUTO: 8 %
NEUTROPHILS # BLD AUTO: 4.2 10E3/UL (ref 1.6–8.3)
NEUTROPHILS NFR BLD AUTO: 66 %
NONHDLC SERPL-MCNC: 134 MG/DL
NRBC # BLD AUTO: 0 10E3/UL
NRBC BLD AUTO-RTO: 0 /100
PLATELET # BLD AUTO: 209 10E3/UL (ref 150–450)
POTASSIUM SERPL-SCNC: 4.1 MMOL/L (ref 3.4–5.3)
PROT SERPL-MCNC: 7.2 G/DL (ref 6.4–8.3)
RBC # BLD AUTO: 4.38 10E6/UL (ref 4.4–5.9)
SODIUM SERPL-SCNC: 141 MMOL/L (ref 135–145)
T CELL COMMENT: ABNORMAL
TRIGL SERPL-MCNC: 138 MG/DL
WBC # BLD AUTO: 6.4 10E3/UL (ref 4–11)

## 2025-07-01 PROCEDURE — 71046 X-RAY EXAM CHEST 2 VIEWS: CPT | Mod: GC | Performed by: RADIOLOGY

## 2025-07-01 PROCEDURE — 86359 T CELLS TOTAL COUNT: CPT | Performed by: INTERNAL MEDICINE

## 2025-07-01 PROCEDURE — 86780 TREPONEMA PALLIDUM: CPT | Performed by: INTERNAL MEDICINE

## 2025-07-01 PROCEDURE — 80061 LIPID PANEL: CPT | Performed by: PATHOLOGY

## 2025-07-01 PROCEDURE — 80053 COMPREHEN METABOLIC PANEL: CPT | Performed by: PATHOLOGY

## 2025-07-01 PROCEDURE — 86592 SYPHILIS TEST NON-TREP QUAL: CPT | Performed by: INTERNAL MEDICINE

## 2025-07-01 PROCEDURE — 36415 COLL VENOUS BLD VENIPUNCTURE: CPT | Performed by: PATHOLOGY

## 2025-07-01 PROCEDURE — 99000 SPECIMEN HANDLING OFFICE-LAB: CPT | Performed by: PATHOLOGY

## 2025-07-01 PROCEDURE — 85025 COMPLETE CBC W/AUTO DIFF WBC: CPT | Performed by: PATHOLOGY

## 2025-07-01 PROCEDURE — 87536 HIV-1 QUANT&REVRSE TRNSCRPJ: CPT | Performed by: INTERNAL MEDICINE

## 2025-07-01 PROCEDURE — 83690 ASSAY OF LIPASE: CPT | Performed by: PATHOLOGY

## 2025-07-01 PROCEDURE — 86780 TREPONEMA PALLIDUM: CPT | Mod: 90 | Performed by: PATHOLOGY

## 2025-07-01 NOTE — TELEPHONE ENCOUNTER
Pt messaged that he is here for labs. Writer called labs to be sure he can have his labs done today. Lab confirmed he will have labs drawn today.     Sharon Nash RN

## 2025-07-01 NOTE — TELEPHONE ENCOUNTER
Pt sent a reply outlining why he is frustrated, outlined difficulty with payment. Writer received message from Dr Ramirez to continue to refill his medications. Routed to Social Work and to Isabel Aj so RN [employer] can have a list of who we work within this way.     Message from Dr Ramirez:    Yury Ramirez MD to Me  Isabel Aj RN        6/30/25  4:57 PM  Thanks much, Sharon!  I also tried to call him just now and left a voicemail message.  He is a long time patient with a chronically very trying life, so bending over backwards to deal with him is probably worth it -- we've managed to keep his HIV suppressed and his immune system intact for about a decade now despite his many psychosocial challenges.  Yury Nash RN

## 2025-07-02 ENCOUNTER — MYC MEDICAL ADVICE (OUTPATIENT)
Dept: INFECTIOUS DISEASES | Facility: CLINIC | Age: 45
End: 2025-07-02
Payer: MEDICAID

## 2025-07-02 LAB
HIV1 RNA # PLAS NAA DL=20: NOT DETECTED COPIES/ML
RPR SER QL: NONREACTIVE
T PALLIDUM AB SER QL: REACTIVE

## 2025-07-02 NOTE — TELEPHONE ENCOUNTER
Pt sent [employer] message requesting protocol in writing.   Writer reviewed history with pt. Notified ID Manager of continue communication of pt and pt request.   Sharon Nash RN

## 2025-07-02 NOTE — TELEPHONE ENCOUNTER
[facility] MD Telephone Note:    Mr. Branch had his routine HIV monitoring laboratory studies drawn yesterday and apparently had some annoyance about the studies that were ordered.  In addition, his appointment scheduled with me for next week, on 7/8/25, was cancelled.  So, I attempted to phone him again to check in with how he is doing and to try to reschedule the appointment, but only reached voice mail again.  I left a message saying I would try to phone him again.

## 2025-07-03 NOTE — TELEPHONE ENCOUNTER
[facility] MD Telephone Note:    I tried to phone Mr. Branch again this late afternoon but only reached voice mail -- I said I will try again.

## 2025-07-13 ENCOUNTER — HEALTH MAINTENANCE LETTER (OUTPATIENT)
Age: 45
End: 2025-07-13

## 2025-07-16 ENCOUNTER — OFFICE VISIT (OUTPATIENT)
Dept: INFECTIOUS DISEASES | Facility: CLINIC | Age: 45
End: 2025-07-16
Attending: INTERNAL MEDICINE

## 2025-07-16 VITALS
SYSTOLIC BLOOD PRESSURE: 121 MMHG | HEART RATE: 51 BPM | OXYGEN SATURATION: 98 % | DIASTOLIC BLOOD PRESSURE: 69 MMHG | WEIGHT: 194 LBS | BODY MASS INDEX: 26.31 KG/M2

## 2025-07-16 DIAGNOSIS — Z21 HUMAN IMMUNODEFICIENCY VIRUS I INFECTION (H): Primary | ICD-10-CM

## 2025-07-16 DIAGNOSIS — R00.2 PALPITATIONS: ICD-10-CM

## 2025-07-16 DIAGNOSIS — M79.10 MYALGIA: ICD-10-CM

## 2025-07-16 DIAGNOSIS — I83.893 VARICOSE VEINS OF BOTH LEGS WITH EDEMA: ICD-10-CM

## 2025-07-16 DIAGNOSIS — R60.0 BILATERAL LEG EDEMA: ICD-10-CM

## 2025-07-16 DIAGNOSIS — R11.0 NAUSEA IN ADULT PATIENT: ICD-10-CM
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
## 2025-07-16 DIAGNOSIS — F15.20 CAFFEINE DEPENDENCE (H): ICD-10-CM

## 2025-07-16 DIAGNOSIS — R53.82 CHRONIC FATIGUE: ICD-10-CM

## 2025-07-16 DIAGNOSIS — R07.89 ATYPICAL CHEST PAIN: ICD-10-CM

## 2025-07-16 DIAGNOSIS — Z72.820 POOR SLEEP: ICD-10-CM

## 2025-07-16 PROCEDURE — G2211 COMPLEX E/M VISIT ADD ON: HCPCS | Performed by: INTERNAL MEDICINE

## 2025-07-16 PROCEDURE — 3074F SYST BP LT 130 MM HG: CPT | Performed by: INTERNAL MEDICINE

## 2025-07-16 PROCEDURE — 3078F DIAST BP <80 MM HG: CPT | Performed by: INTERNAL MEDICINE

## 2025-07-16 PROCEDURE — 99215 OFFICE O/P EST HI 40 MIN: CPT | Performed by: INTERNAL MEDICINE

## 2025-07-16 PROCEDURE — 99213 OFFICE O/P EST LOW 20 MIN: CPT | Performed by: INTERNAL MEDICINE

## 2025-07-16 PROCEDURE — 99417 PROLNG OP E/M EACH 15 MIN: CPT | Performed by: INTERNAL MEDICINE
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
## 2025-07-16 NOTE — NURSING NOTE
Chief Complaint   Patient presents with    RECHECK     B20        Vital signs:      BP: 121/69 Pulse: 51     SpO2: 98 %       Weight: 88 kg (194 lb)  Estimated body mass index is 26.31 kg/m  as calculated from the following:    Height as of 1/9/23: 1.829 m (6').    Weight as of this encounter: 88 kg (194 lb).      Antonietta Bonds CMA   7/16/2025 3:18 PM

## 2025-07-17 ENCOUNTER — PATIENT OUTREACH (OUTPATIENT)
Dept: CARE COORDINATION | Facility: CLINIC | Age: 45
End: 2025-07-17
Payer: MEDICAID

## 2025-07-17 ENCOUNTER — TELEPHONE (OUTPATIENT)
Dept: INFECTIOUS DISEASES | Facility: CLINIC | Age: 45
End: 2025-07-17
Payer: MEDICAID

## 2025-07-17 NOTE — TELEPHONE ENCOUNTER
EP called 7/17 to sched a lab appt per Dr. Ramirez and pt said he'll sched his follow up with Dr. Ramirez later. He needs to sched for around 9/9 per checkout notes from 7/16.

## 2025-07-21 ENCOUNTER — PATIENT OUTREACH (OUTPATIENT)
Dept: CARE COORDINATION | Facility: CLINIC | Age: 45
End: 2025-07-21

## 2025-07-21 ENCOUNTER — MYC MEDICAL ADVICE (OUTPATIENT)
Dept: INFECTIOUS DISEASES | Facility: CLINIC | Age: 45
End: 2025-07-21

## 2025-07-28 DIAGNOSIS — Z21 HUMAN IMMUNODEFICIENCY VIRUS I INFECTION (H): ICD-10-CM

## 2025-07-28 DIAGNOSIS — R19.7 DIARRHEA, UNSPECIFIED TYPE: ICD-10-CM

## 2025-07-28 RX ORDER — EMTRICITABINE AND TENOFOVIR ALAFENAMIDE 200; 25 MG/1; MG/1
1 TABLET ORAL DAILY
Qty: 30 TABLET | Refills: 0 | Status: SHIPPED | OUTPATIENT
Start: 2025-07-28 | End: 2025-07-28

## 2025-07-28 RX ORDER — EMTRICITABINE AND TENOFOVIR ALAFENAMIDE 200; 25 MG/1; MG/1
1 TABLET ORAL DAILY
Qty: 90 TABLET | Refills: 3 | Status: SHIPPED | OUTPATIENT
Start: 2025-07-28

## 2025-07-28 RX ORDER — DOLUTEGRAVIR SODIUM 50 MG/1
50 TABLET, FILM COATED ORAL DAILY
Qty: 30 TABLET | Refills: 0 | Status: SHIPPED | OUTPATIENT
Start: 2025-07-28 | End: 2025-07-28

## 2025-07-28 RX ORDER — LOPERAMIDE HYDROCHLORIDE 2 MG/1
CAPSULE ORAL
Qty: 120 CAPSULE | Refills: 3 | Status: SHIPPED | OUTPATIENT
Start: 2025-07-28

## 2025-07-28 RX ORDER — DOLUTEGRAVIR SODIUM 50 MG/1
50 TABLET, FILM COATED ORAL DAILY
Qty: 90 TABLET | Refills: 3 | Status: SHIPPED | OUTPATIENT
Start: 2025-07-28

## 2025-07-28 NOTE — TELEPHONE ENCOUNTER
"Mediation refill protocol failed- tivicay and descovy      Reason: Other (specify)       ANTIRETROVIRALS [id number]/28/2025 10:12 AM   Protocol Details Has GFR on file in past 12 months and most recent value is normal    Most recent HIV measures less than 50 copies/ml          Action: Routed to Provider for further review         speciality provider within that speciality    Plan of care from last visit:       \"Mr. Branch continues to take his dolutegravir plus Descovy antiretroviral therapy very consistent with good medication tolerance and with a continued undetectable HIV plasma viral load and a normal absolute CD4+ cell count\"  "